# Patient Record
Sex: MALE | Race: WHITE | Employment: OTHER | URBAN - METROPOLITAN AREA
[De-identification: names, ages, dates, MRNs, and addresses within clinical notes are randomized per-mention and may not be internally consistent; named-entity substitution may affect disease eponyms.]

---

## 2017-01-17 RX ORDER — ZOLPIDEM TARTRATE 10 MG/1
TABLET ORAL
Qty: 30 TABLET | Refills: 2 | Status: SHIPPED
Start: 2017-01-17 | End: 2017-07-10

## 2017-01-17 NOTE — TELEPHONE ENCOUNTER
Message noted. Chart reviewed. May refill medication as requested. Script reviewed and signed/ printed. Will have nurse fax into pharmacy as this is controlled substance and notify patient.

## 2017-03-13 ENCOUNTER — OFFICE VISIT (OUTPATIENT)
Dept: DERMATOLOGY CLINIC | Facility: CLINIC | Age: 62
End: 2017-03-13

## 2017-03-13 DIAGNOSIS — D23.30 BENIGN NEOPLASM OF SKIN OF FACE: ICD-10-CM

## 2017-03-13 DIAGNOSIS — Z85.828 HISTORY OF SKIN CANCER: ICD-10-CM

## 2017-03-13 DIAGNOSIS — D23.4 BENIGN NEOPLASM OF SCALP AND SKIN OF NECK: ICD-10-CM

## 2017-03-13 DIAGNOSIS — L57.0 AK (ACTINIC KERATOSIS): Primary | ICD-10-CM

## 2017-03-13 DIAGNOSIS — D22.9 MULTIPLE NEVI: ICD-10-CM

## 2017-03-13 DIAGNOSIS — D23.70 BENIGN NEOPLASM OF SKIN OF LOWER LIMB, INCLUDING HIP, UNSPECIFIED LATERALITY: ICD-10-CM

## 2017-03-13 DIAGNOSIS — D23.5 BENIGN NEOPLASM OF SKIN OF TRUNK, EXCEPT SCROTUM: ICD-10-CM

## 2017-03-13 DIAGNOSIS — L82.1 SEBORRHEIC KERATOSES: ICD-10-CM

## 2017-03-13 DIAGNOSIS — D23.60 BENIGN NEOPLASM OF SKIN OF UPPER LIMB, INCLUDING SHOULDER, UNSPECIFIED LATERALITY: ICD-10-CM

## 2017-03-13 PROCEDURE — 99213 OFFICE O/P EST LOW 20 MIN: CPT | Performed by: DERMATOLOGY

## 2017-03-13 PROCEDURE — 17000 DESTRUCT PREMALG LESION: CPT | Performed by: DERMATOLOGY

## 2017-03-27 NOTE — PROGRESS NOTES
Nidia Coelho is a 63year old male. HPI:     CC:  Patient presents with:  Full Skin Exam: LOV 10/10/2016. Pt presenting for full body skin exam. Personal hx of AKs and BCC. Allergies:  Review of patient's allergies indicates no known allergies. Actinic keratosis    • Skin cancer 2-1-16     BCC L ant. hairline         Past Surgical History    OTHER SURGICAL HISTORY  12/1/2001    Comment pituitary tumor removed    COLONOSCOPY      CHOLECYSTECTOMY         Social History   Marital Status:   Sp mucosa, eyelids, lips external ears, back, chest,/ breasts, axillae,  abdomen, arms, legs, palms.      Multiple light to medium brown, well marginated, uniformly pigmented, macules and papules 6 mm and less scattered on exam. pigmented lesions examined with not resolved. Flesh-colored papule at right ala observe. 3 mm. Patient with history of skin cancer. No evidence of recurrence. No new skin cancer.     Multiple lentigines nevi keratoses temples hairline upper back shoulders lower extremities no o

## 2017-04-14 ENCOUNTER — OFFICE VISIT (OUTPATIENT)
Dept: DERMATOLOGY CLINIC | Facility: CLINIC | Age: 62
End: 2017-04-14

## 2017-04-14 DIAGNOSIS — L82.1 SEBORRHEIC KERATOSES: ICD-10-CM

## 2017-04-14 DIAGNOSIS — D23.9 BENIGN NEOPLASM OF SKIN, UNSPECIFIED LOCATION: ICD-10-CM

## 2017-04-14 DIAGNOSIS — Z85.828 HISTORY OF SKIN CANCER: ICD-10-CM

## 2017-04-14 DIAGNOSIS — L57.0 AK (ACTINIC KERATOSIS): Primary | ICD-10-CM

## 2017-04-14 PROCEDURE — 99212 OFFICE O/P EST SF 10 MIN: CPT | Performed by: DERMATOLOGY

## 2017-04-14 PROCEDURE — 17000 DESTRUCT PREMALG LESION: CPT | Performed by: DERMATOLOGY

## 2017-05-01 NOTE — PROGRESS NOTES
Ben Lebron is a 63year old male. HPI:     CC:  Patient presents with:  Lesion: Established pt (LOV 3/13/2017) presents with new lesion on forehead that is nonhealing x3 weeks. Hx oa AKs and BCC. Pt applying cetaphil.          Allergies:  Review of patie • Sinus disorder      sinus problems   • Actinic keratosis    • Skin cancer 2-1-16     BCC L ant. hairline         Past Surgical History    OTHER SURGICAL HISTORY  12/1/2001    Comment pituitary tumor removed    COLONOSCOPY      CHOLECYSTECTOMY         S scalp, head, neck, face,nails, hair, external eyes, including conjunctival mucosa, eyelids, lips external ears , arms, digits,palms.      Multiple light to medium brown, well marginated, uniformly pigmented, macules and papules 6 mm and less scattered on ex See  Medications in grid. Instructions reviewed at length. Benign nevi, seborrheic  keratoses, cherry angiomas:  Reassurance regarding other benign skin lesions. Signs and symptoms of skin cancer, ABCDE's of melanoma discussed with patient.  Sunscreen us

## 2017-05-30 ENCOUNTER — OFFICE VISIT (OUTPATIENT)
Dept: FAMILY MEDICINE CLINIC | Facility: CLINIC | Age: 62
End: 2017-05-30

## 2017-05-30 VITALS
SYSTOLIC BLOOD PRESSURE: 148 MMHG | WEIGHT: 177 LBS | BODY MASS INDEX: 23.98 KG/M2 | DIASTOLIC BLOOD PRESSURE: 91 MMHG | HEART RATE: 64 BPM | HEIGHT: 72 IN | TEMPERATURE: 98 F | RESPIRATION RATE: 18 BRPM

## 2017-05-30 DIAGNOSIS — Z00.00 ROUTINE PHYSICAL EXAMINATION: ICD-10-CM

## 2017-05-30 PROCEDURE — 99396 PREV VISIT EST AGE 40-64: CPT | Performed by: FAMILY MEDICINE

## 2017-05-30 NOTE — PROGRESS NOTES
HPI:    Patient ID: Max Dickson is a [address] old male. HPI Comments: Patient is here for routine physical exam. No acute issues. No significant chronic medical problems. Patient is requesting his blood testing. Diet and exercise have been better now.  P normal. Judgment and thought content normal.   Vitals reviewed. ASSESSMENT/PLAN:   Routine physical examination:  - Exam is unremarkable. Screening tests were discussed, and after discussion, will check lab work / PSA as below.  Healthy diet, ex

## 2017-07-11 RX ORDER — ZOLPIDEM TARTRATE 10 MG/1
TABLET ORAL
Qty: 30 TABLET | Refills: 1 | Status: SHIPPED
Start: 2017-07-11 | End: 2017-07-11

## 2017-07-12 RX ORDER — ZOLPIDEM TARTRATE 10 MG/1
TABLET ORAL
Qty: 30 TABLET | Refills: 2 | Status: SHIPPED
Start: 2017-07-12 | End: 2018-03-13

## 2017-09-25 ENCOUNTER — OFFICE VISIT (OUTPATIENT)
Dept: DERMATOLOGY CLINIC | Facility: CLINIC | Age: 62
End: 2017-09-25

## 2017-09-25 DIAGNOSIS — D23.4 BENIGN NEOPLASM OF SCALP AND SKIN OF NECK: ICD-10-CM

## 2017-09-25 DIAGNOSIS — D23.60 BENIGN NEOPLASM OF SKIN OF UPPER LIMB, INCLUDING SHOULDER, UNSPECIFIED LATERALITY: ICD-10-CM

## 2017-09-25 DIAGNOSIS — Z85.828 HISTORY OF SKIN CANCER: ICD-10-CM

## 2017-09-25 DIAGNOSIS — D23.30 BENIGN NEOPLASM OF SKIN OF FACE: ICD-10-CM

## 2017-09-25 DIAGNOSIS — L82.1 SEBORRHEIC KERATOSES: ICD-10-CM

## 2017-09-25 DIAGNOSIS — L57.0 AK (ACTINIC KERATOSIS): Primary | ICD-10-CM

## 2017-09-25 DIAGNOSIS — D22.9 MULTIPLE NEVI: ICD-10-CM

## 2017-09-25 PROCEDURE — 99213 OFFICE O/P EST LOW 20 MIN: CPT | Performed by: DERMATOLOGY

## 2017-09-25 PROCEDURE — 17000 DESTRUCT PREMALG LESION: CPT | Performed by: DERMATOLOGY

## 2017-09-25 PROCEDURE — 99212 OFFICE O/P EST SF 10 MIN: CPT | Performed by: DERMATOLOGY

## 2017-10-08 NOTE — PROGRESS NOTES
Marcello Dhaliwal is a 63year old male. HPI:     CC:  Patient presents with:  Lesion: LOV: 4/14/17 Presents with new lesion on scalp x1year. Pt has hx of AKs and BCC. Allergies:  Review of patient's allergies indicates no known allergies.     HISTORY: sinus problems   • Skin cancer 2-1-16    BCC L ant. hairline     Past Surgical History:  No date: CHOLECYSTECTOMY  No date: COLONOSCOPY  12/1/2001: OTHER SURGICAL HISTORY      Comment: pituitary tumor removed    Social History  Social History   Marital sta external eyes, including conjunctival mucosa, eyelids, lips external ears , arms, digits,palms.      Multiple light to medium brown, well marginated, uniformly pigmented, macules and papules 6 mm and less scattered on exam. pigmented lesions examined with d irritation pain tenderness potential discussed. Anticipate one month for resolution of symptoms. Plan recheck in one month after completion of treatment course. Consider alternative treatment biopsy if not resolved.   Had not used recenltly --would  Use

## 2018-03-13 RX ORDER — ZOLPIDEM TARTRATE 10 MG/1
TABLET ORAL
Qty: 30 TABLET | Refills: 2 | Status: SHIPPED
Start: 2018-03-13 | End: 2018-03-14

## 2018-03-14 RX ORDER — ZOLPIDEM TARTRATE 10 MG/1
TABLET ORAL
Qty: 30 TABLET | Refills: 2 | Status: SHIPPED
Start: 2018-03-14 | End: 2018-09-07

## 2018-03-15 NOTE — TELEPHONE ENCOUNTER
Per Jenn from [facility] pharm they already rec'd rx ref for zolpidem. .  See refill notes 3-14-18, Rx faxed/confirmed to [facility] .

## 2018-03-22 ENCOUNTER — OFFICE VISIT (OUTPATIENT)
Dept: FAMILY MEDICINE CLINIC | Facility: CLINIC | Age: 63
End: 2018-03-22

## 2018-03-22 VITALS
HEIGHT: 72 IN | RESPIRATION RATE: 18 BRPM | SYSTOLIC BLOOD PRESSURE: 138 MMHG | BODY MASS INDEX: 24.52 KG/M2 | TEMPERATURE: 98 F | HEART RATE: 65 BPM | WEIGHT: 181 LBS | DIASTOLIC BLOOD PRESSURE: 87 MMHG

## 2018-03-22 DIAGNOSIS — Z12.11 COLON CANCER SCREENING: Primary | ICD-10-CM

## 2018-03-22 DIAGNOSIS — Z00.00 ROUTINE PHYSICAL EXAMINATION: ICD-10-CM

## 2018-03-22 PROCEDURE — 99396 PREV VISIT EST AGE 40-64: CPT | Performed by: FAMILY MEDICINE

## 2018-03-22 RX ORDER — VALACYCLOVIR HYDROCHLORIDE 500 MG/1
0.5 TABLET, FILM COATED ORAL EVERY 12 HOURS SCHEDULED
Qty: 14 TABLET | Refills: 2 | Status: SHIPPED | OUTPATIENT
Start: 2018-03-22 | End: 2019-05-02

## 2018-03-22 NOTE — PROGRESS NOTES
HPI:    Patient ID: Nesha Brito is a 63year old male. Patient is here for routine physical exam. No acute issues. No significant chronic medical problems. Patient is requesting testing. Diet and exercise have been good.  Past medical history, family hi Genitourinary Comments: Prostate exam discussed; deferred at this time. Musculoskeletal: Normal range of motion. Lymphadenopathy:     He has no cervical adenopathy. Neurological: He is alert. He has normal reflexes. Skin: No rash noted.    Psychia

## 2018-04-04 ENCOUNTER — TELEPHONE (OUTPATIENT)
Dept: DERMATOLOGY CLINIC | Facility: CLINIC | Age: 63
End: 2018-04-04

## 2018-04-04 ENCOUNTER — OFFICE VISIT (OUTPATIENT)
Dept: DERMATOLOGY CLINIC | Facility: CLINIC | Age: 63
End: 2018-04-04

## 2018-04-04 DIAGNOSIS — D23.60 BENIGN NEOPLASM OF SKIN OF UPPER LIMB, INCLUDING SHOULDER, UNSPECIFIED LATERALITY: ICD-10-CM

## 2018-04-04 DIAGNOSIS — D23.5 BENIGN NEOPLASM OF SKIN OF TRUNK, EXCEPT SCROTUM: ICD-10-CM

## 2018-04-04 DIAGNOSIS — D23.4 BENIGN NEOPLASM OF SCALP AND SKIN OF NECK: ICD-10-CM

## 2018-04-04 DIAGNOSIS — Z85.828 HISTORY OF SKIN CANCER: ICD-10-CM

## 2018-04-04 DIAGNOSIS — D23.30 BENIGN NEOPLASM OF SKIN OF FACE: ICD-10-CM

## 2018-04-04 DIAGNOSIS — L82.1 SEBORRHEIC KERATOSES: ICD-10-CM

## 2018-04-04 DIAGNOSIS — D22.9 MULTIPLE NEVI: ICD-10-CM

## 2018-04-04 DIAGNOSIS — L57.0 AK (ACTINIC KERATOSIS): Primary | ICD-10-CM

## 2018-04-04 PROCEDURE — 99212 OFFICE O/P EST SF 10 MIN: CPT | Performed by: DERMATOLOGY

## 2018-04-04 PROCEDURE — 99213 OFFICE O/P EST LOW 20 MIN: CPT | Performed by: DERMATOLOGY

## 2018-04-04 PROCEDURE — 17000 DESTRUCT PREMALG LESION: CPT | Performed by: DERMATOLOGY

## 2018-04-06 ENCOUNTER — APPOINTMENT (OUTPATIENT)
Dept: LAB | Facility: HOSPITAL | Age: 63
End: 2018-04-06
Attending: FAMILY MEDICINE
Payer: COMMERCIAL

## 2018-04-06 DIAGNOSIS — Z00.00 ROUTINE PHYSICAL EXAMINATION: ICD-10-CM

## 2018-04-06 PROCEDURE — 80053 COMPREHEN METABOLIC PANEL: CPT

## 2018-04-06 PROCEDURE — 80061 LIPID PANEL: CPT

## 2018-04-06 PROCEDURE — 84443 ASSAY THYROID STIM HORMONE: CPT

## 2018-04-06 PROCEDURE — 36415 COLL VENOUS BLD VENIPUNCTURE: CPT

## 2018-04-06 PROCEDURE — 85027 COMPLETE CBC AUTOMATED: CPT

## 2018-04-11 NOTE — TELEPHONE ENCOUNTER
Could try tazorac at [facility] if he is ok with this. A little stronger so use 2-3 times per week to start.  Still helps with the ak's etc.

## 2018-04-11 NOTE — TELEPHONE ENCOUNTER
s/w pt. He picked up tretinoin for his AKs but doesn't remember how long he should use it for? Kindly advise please.

## 2018-04-12 NOTE — TELEPHONE ENCOUNTER
Pt contacted, given Dr. Moses Toscano' advice below. Pt verbalized understanding and repeated back information.   Pt instructed that if tretinoin does cause areas on face to become irritated he can wash face with a mild/moisturizing cleanser like that of Cetaphil

## 2018-04-12 NOTE — TELEPHONE ENCOUNTER
For the tretinoin --For sk's may use 2-3 times a week up to daily if not irritated just on the brown spots. For ak's all over forehead temples nose cheeks, etc ( bascially wht whole face).  Pea sized dot at night , 2-3 x/week--might get red or scaly so he

## 2018-04-15 NOTE — PROGRESS NOTES
Jah Anders is a 63year old male. HPI:     CC:  Patient presents with:  Full Skin Exam: LOV: 09/25/17. Pt presents for a full skin exam. Pt has personal hx of AKS and BCC. Allergies:  Patient has no known allergies.     HISTORY:    Past Medical H Last, done by Dr. Solo Almendarez   • Pituitary adenoma [facility])     tsh   • Pituitary tumor 12/2001   • Sinus disorder     sinus problems   • Skin cancer 2-1-16    BCC L ant. hairline     Past Surgical History:  No date: CHOLECYSTECTOMY  No date: COLONOSCOPY  12/ Well-developed well-nourished patient alert oriented in no acute distress. Exam performed, including scalp, head, neck, face,nails, hair, external eyes, including conjunctival mucosa, eyelids, lips external ears , arms, digits,palms.      Multiple light to discussed. Lesions treated with cryo- . Biopsy if not resolved. Actinic keratoses. Precancerous nature discussed.   Treatment options reviewed at length we will proceed with topical therapy with zyclara   to left temple, right temple lateral zygomatic

## 2018-05-30 ENCOUNTER — OFFICE VISIT (OUTPATIENT)
Dept: DERMATOLOGY CLINIC | Facility: CLINIC | Age: 63
End: 2018-05-30

## 2018-05-30 DIAGNOSIS — L82.1 SEBORRHEIC KERATOSES: ICD-10-CM

## 2018-05-30 DIAGNOSIS — D23.4 BENIGN NEOPLASM OF SCALP AND SKIN OF NECK: ICD-10-CM

## 2018-05-30 DIAGNOSIS — D23.30 BENIGN NEOPLASM OF SKIN OF FACE: ICD-10-CM

## 2018-05-30 DIAGNOSIS — Z85.828 HISTORY OF SKIN CANCER: ICD-10-CM

## 2018-05-30 DIAGNOSIS — L57.0 AK (ACTINIC KERATOSIS): Primary | ICD-10-CM

## 2018-05-30 DIAGNOSIS — D22.9 MULTIPLE NEVI: ICD-10-CM

## 2018-05-30 PROCEDURE — 99213 OFFICE O/P EST LOW 20 MIN: CPT | Performed by: DERMATOLOGY

## 2018-05-30 PROCEDURE — 99212 OFFICE O/P EST SF 10 MIN: CPT | Performed by: DERMATOLOGY

## 2018-05-30 RX ORDER — AZITHROMYCIN 250 MG/1
TABLET, FILM COATED ORAL
COMMUNITY
Start: 2018-04-24 | End: 2019-04-23

## 2018-05-30 RX ORDER — BENZONATATE 100 MG/1
CAPSULE ORAL
COMMUNITY
Start: 2018-04-24 | End: 2019-04-23

## 2018-06-10 NOTE — PROGRESS NOTES
Rj Mckeon is a 63year old male. HPI:     CC:  Patient presents with:  Actinic Keratosis: LOV: 04/04/18. Pt presents with f/u to AK, reassessment today. Allergies:  Patient has no known allergies.     HISTORY:    Past Medical History:   Diagnosis • Measles    • Mumps    • Pancreatitis 2010   • Physical exam 2004    Last, done by Dr. Jose Manuel Carlson   • Pituitary adenoma [facility])     tsh   • Pituitary tumor 12/2001   • Sinus disorder     sinus problems   • Skin cancer 2-1-16    BCC L ant. hairline     Past S changeing lesions other than noted above. No fevers, chills, night sweats, unusual sun sensitivity. No other skin complaints. History, medications, allergies reviewed as noted.        Physical Examination:     Well-developed well-nourished patient a to these areas 2-3 times weekly as tolerated. Titrate up dose extra sun protection and moisturizer underneath. Call if more irritated fact this may help with some background sun damage, cancer and AK prevention discussed. Actinic keratoses.   Precanc of skin cancer, ABCDE's of melanoma discussed with patient. Sunscreen use, sun protection, self exams reviewed. Followup as noted RTC ---routine checkup    6 mos -one year or p.r.n. No other new suspicious lesions plan recheck 6 months or as needed.

## 2018-09-08 RX ORDER — ZOLPIDEM TARTRATE 10 MG/1
TABLET ORAL
Qty: 30 TABLET | Refills: 2 | Status: SHIPPED
Start: 2018-09-08 | End: 2019-04-11

## 2018-09-08 NOTE — TELEPHONE ENCOUNTER
No Protocol on this med.        Pending Prescriptions Disp Refills    ZOLPIDEM TARTRATE 10 MG Oral Tab [Pharmacy Med Name: ZOLPIDEM TARTRATE 10 MG TABLET] 30 tablet 2     Sig: TAKE 1 TABLET BY MOUTH AT BEDTIME AS NEEDED FOR SLEEP           LOV 3-22-18  LR 3

## 2018-12-14 ENCOUNTER — TELEPHONE (OUTPATIENT)
Dept: DERMATOLOGY CLINIC | Facility: CLINIC | Age: 63
End: 2018-12-14

## 2018-12-14 NOTE — TELEPHONE ENCOUNTER
Pt does not want to wait till end of January for an appt. It is not an emergency type visit but he claims it is very frustrating to wait 4 wks for an appt. pls call to discuss. . I told him we possibly would have to have him wait till January for an appt.

## 2018-12-19 NOTE — TELEPHONE ENCOUNTER
S/w pt - pt with a new lesion to forehead that is \"sore\" wants to come in sooner than next avilable appt - appt made for 12/28/18.

## 2018-12-28 ENCOUNTER — OFFICE VISIT (OUTPATIENT)
Dept: DERMATOLOGY CLINIC | Facility: CLINIC | Age: 63
End: 2018-12-28
Payer: COMMERCIAL

## 2018-12-28 DIAGNOSIS — D23.30 BENIGN NEOPLASM OF SKIN OF FACE: ICD-10-CM

## 2018-12-28 DIAGNOSIS — L57.0 AK (ACTINIC KERATOSIS): Primary | ICD-10-CM

## 2018-12-28 DIAGNOSIS — Z85.828 HISTORY OF SKIN CANCER: ICD-10-CM

## 2018-12-28 DIAGNOSIS — D23.4 BENIGN NEOPLASM OF SCALP AND SKIN OF NECK: ICD-10-CM

## 2018-12-28 DIAGNOSIS — D23.5 BENIGN NEOPLASM OF SKIN OF TRUNK, EXCEPT SCROTUM: ICD-10-CM

## 2018-12-28 DIAGNOSIS — L82.1 SEBORRHEIC KERATOSES: ICD-10-CM

## 2018-12-28 DIAGNOSIS — D22.9 MULTIPLE NEVI: ICD-10-CM

## 2018-12-28 DIAGNOSIS — D23.60 BENIGN NEOPLASM OF SKIN OF UPPER LIMB, INCLUDING SHOULDER, UNSPECIFIED LATERALITY: ICD-10-CM

## 2018-12-28 PROCEDURE — 17000 DESTRUCT PREMALG LESION: CPT | Performed by: DERMATOLOGY

## 2018-12-28 PROCEDURE — 99212 OFFICE O/P EST SF 10 MIN: CPT | Performed by: DERMATOLOGY

## 2018-12-28 PROCEDURE — 17003 DESTRUCT PREMALG LES 2-14: CPT | Performed by: DERMATOLOGY

## 2018-12-28 PROCEDURE — 99213 OFFICE O/P EST LOW 20 MIN: CPT | Performed by: DERMATOLOGY

## 2019-01-07 NOTE — PROGRESS NOTES
Maida Howard is a 66year old male. HPI:     CC:  Patient presents with:  Upper Body Exam: LOV 5/30/2018 Patient present with painful lesion of concern and question regarding medications .  Patient has personal hx of AK and BCC        Allergies:  Patient h Diagnosis Date   • Actinic keratosis    • Basal cell carcinoma    • Chicken pox    • Measles    • Mumps    • Pancreatitis 2010   • Physical exam 2004    Last, done by Dr. Mona Vasquez   • Pituitary adenoma [facility])     tsh   • Pituitary tumor 12/2001   • Sinus d Special Diet: Not Asked        Back Care: Not Asked        Exercise: Not Asked        Bike Helmet: Not Asked        Seat Belt: Not Asked        Self-Exams: Not Asked    Social History Narrative      Not on file    Family History   Problem Relation Age of O specific lesions. Otherwise remarkable for lesions as noted on map. Assessment / plan:    No orders of the defined types were placed in this encounter.       Meds & Refills for this Visit:  Requested Prescriptions      No prescriptions requested or ord various therapies, risks benefits discussed. Pathophysiology discussed with patient. Therapeutic options reviewed. See  Medications in grid. Instructions reviewed at length.     Benign nevi, seborrheic  keratoses, cherry angiomas:  Reassurance regarding o

## 2019-01-09 ENCOUNTER — TELEPHONE (OUTPATIENT)
Dept: DERMATOLOGY CLINIC | Facility: CLINIC | Age: 64
End: 2019-01-09

## 2019-01-09 NOTE — TELEPHONE ENCOUNTER
LOV 12/28/18 pt states his lesions to face have not fallen off yet (cryo done at last visit) pt informed it may be too soon but he insists that he's had this done before and they should have fallen off by now. Wants to know what to do next, please advise.

## 2019-04-13 RX ORDER — ZOLPIDEM TARTRATE 10 MG/1
TABLET ORAL
Qty: 30 TABLET | Refills: 0 | OUTPATIENT
Start: 2019-04-13 | End: 2019-06-22

## 2019-04-13 NOTE — TELEPHONE ENCOUNTER
Controlled medication pending for review. If approved needs to be called in or faxed by on-site staff.     Last Rx: 9/8/18  Requested Prescriptions     Pending Prescriptions Disp Refills   • ZOLPIDEM TARTRATE 10 MG Oral Tab [Pharmacy Med Name: ZOLPIDEM TAR

## 2019-04-23 ENCOUNTER — TELEPHONE (OUTPATIENT)
Dept: DERMATOLOGY CLINIC | Facility: CLINIC | Age: 64
End: 2019-04-23

## 2019-04-23 ENCOUNTER — OFFICE VISIT (OUTPATIENT)
Dept: FAMILY MEDICINE CLINIC | Facility: CLINIC | Age: 64
End: 2019-04-23
Payer: COMMERCIAL

## 2019-04-23 VITALS
RESPIRATION RATE: 18 BRPM | HEIGHT: 70.7 IN | BODY MASS INDEX: 25.06 KG/M2 | HEART RATE: 74 BPM | WEIGHT: 179 LBS | TEMPERATURE: 98 F | SYSTOLIC BLOOD PRESSURE: 138 MMHG | DIASTOLIC BLOOD PRESSURE: 80 MMHG

## 2019-04-23 DIAGNOSIS — Z00.00 ROUTINE PHYSICAL EXAMINATION: Primary | ICD-10-CM

## 2019-04-23 PROCEDURE — 99396 PREV VISIT EST AGE 40-64: CPT | Performed by: FAMILY MEDICINE

## 2019-04-23 NOTE — TELEPHONE ENCOUNTER
He has questions about the two creams that he is supposed to be using. He needs to know which cream on which area.

## 2019-04-23 NOTE — PROGRESS NOTES
HPI:    Patient ID: Pino Jiang is a 66year old male. Patient is here for routine physical exam. No acute issues. No significant chronic medical problems. Patient is requesting testing.  Diet and exercise have been fairly good Past medical history, fam to person, place, and time. He has normal reflexes. Skin: Skin is warm and dry. Psychiatric: He has a normal mood and affect. His behavior is normal.              ASSESSMENT/PLAN:   Routine physical examination:  - Exam is unremarkable.  Screening tests

## 2019-04-24 NOTE — TELEPHONE ENCOUNTER
Pt last seen 12/28/18. S/w pt. He never used tretinoin and has not restarted zyclara (last used in 2016) wants to review where and how often he should use it now?  Or F/U first? Please advise

## 2019-04-24 NOTE — TELEPHONE ENCOUNTER
Clarified with KMT - pt not to use tretinoin at all at this time, and use zyclara 2x week for 4 weeks.  He will need a refill on it, I could not pend it as a refill since it's not under current meds, please advise if you can send it as a refill on your end,

## 2019-04-24 NOTE — TELEPHONE ENCOUNTER
zyclara to ears forehead ( lateral especially left) cheekbones ( zygomatic areas bilat)  2x per week x 4-6 weeks--break for 1 week if too irritated.

## 2019-04-25 NOTE — TELEPHONE ENCOUNTER
Pt informed that medication was sent to pharmacy. Reviewed medication instructions. Voiced understanding.

## 2019-05-02 RX ORDER — VALACYCLOVIR HYDROCHLORIDE 500 MG/1
0.5 TABLET, FILM COATED ORAL EVERY 12 HOURS SCHEDULED
Qty: 14 TABLET | Refills: 0 | Status: SHIPPED | OUTPATIENT
Start: 2019-05-02 | End: 2019-10-18

## 2019-05-02 NOTE — TELEPHONE ENCOUNTER
Is there a pharmacy that Camilo Gomeso can be sent to. Pharmacies locally are out of stock.  Please call

## 2019-05-02 NOTE — TELEPHONE ENCOUNTER
Review pended refill request as it does not fall under a protocol.     Last Rx: 03/22/18  LOV: 04/23/19

## 2019-05-02 NOTE — TELEPHONE ENCOUNTER
Message noted: Chart reviewed and may refill medication as requested times one. Prescription sent to listed pharmacy. can notify patient.

## 2019-05-07 ENCOUNTER — TELEPHONE (OUTPATIENT)
Dept: DERMATOLOGY CLINIC | Facility: CLINIC | Age: 64
End: 2019-05-07

## 2019-05-07 RX ORDER — IMIQUIMOD 37.5 MG/G
CREAM TOPICAL
Qty: 1 EACH | Refills: 1 | Status: SHIPPED | OUTPATIENT
Start: 2019-05-07 | End: 2020-01-24

## 2019-05-07 NOTE — TELEPHONE ENCOUNTER
Ok to change. Please reassure pt not much difference in irritation between strenghths.   New Rx sent for 3.75% cream to THE [facility] [place]

## 2019-06-24 RX ORDER — ZOLPIDEM TARTRATE 10 MG/1
TABLET ORAL
Qty: 30 TABLET | Refills: 2 | Status: SHIPPED
Start: 2019-06-24 | End: 2019-12-26

## 2019-06-24 NOTE — TELEPHONE ENCOUNTER
Controlled medications pending for review. If approved needs to be called in or faxed by on site staff.     Last Rx: 4-13-19 # 30  LOV: 4-23-19    Requested Prescriptions     Pending Prescriptions Disp Refills   • ZOLPIDEM TARTRATE 10 MG Oral Tab [Pharmacy

## 2019-06-24 NOTE — TELEPHONE ENCOUNTER
[facility] Fax was successful.

## 2019-06-26 ENCOUNTER — TELEPHONE (OUTPATIENT)
Dept: DERMATOLOGY CLINIC | Facility: CLINIC | Age: 64
End: 2019-06-26

## 2019-06-26 NOTE — TELEPHONE ENCOUNTER
Pt is very concerned over some spots that just appeared, he does not want to wait 4 or 5 wks for appt.  pls call to discuss

## 2019-06-26 NOTE — TELEPHONE ENCOUNTER
Established pt, s/p use of zyclara to face - wants to be re-evaled, pt also concerned about two lesions to left shoulder that he noticed a week ago - denies discomfort, lesions are bumpy \"squished together\".  Wants to be seen sooner than next avilable - a

## 2019-07-01 ENCOUNTER — OFFICE VISIT (OUTPATIENT)
Dept: DERMATOLOGY CLINIC | Facility: CLINIC | Age: 64
End: 2019-07-01
Payer: COMMERCIAL

## 2019-07-01 DIAGNOSIS — D48.5 NEOPLASM OF UNCERTAIN BEHAVIOR OF SKIN: Primary | ICD-10-CM

## 2019-07-01 PROCEDURE — 99213 OFFICE O/P EST LOW 20 MIN: CPT | Performed by: DERMATOLOGY

## 2019-07-01 PROCEDURE — 88313 SPECIAL STAINS GROUP 2: CPT

## 2019-07-01 PROCEDURE — 11102 TANGNTL BX SKIN SINGLE LES: CPT | Performed by: DERMATOLOGY

## 2019-07-01 PROCEDURE — 88341 IMHCHEM/IMCYTCHM EA ADD ANTB: CPT

## 2019-07-01 PROCEDURE — 88342 IMHCHEM/IMCYTCHM 1ST ANTB: CPT

## 2019-07-01 PROCEDURE — 88325 CONSLTJ COMPRE RVW REC REPRT: CPT | Performed by: DERMATOLOGY

## 2019-07-08 ENCOUNTER — TELEPHONE (OUTPATIENT)
Dept: DERMATOLOGY CLINIC | Facility: CLINIC | Age: 64
End: 2019-07-08

## 2019-07-08 NOTE — TELEPHONE ENCOUNTER
Call received from Dr. Triston Shah pathology, biopsy done 7/1/19 to left anterior pinna revealed a sebaceous carcinoma - path to follow

## 2019-07-10 NOTE — TELEPHONE ENCOUNTER
Discussed with Lorin Saenz from [place] [facility] - she will let Dr. Cecilio Martinez know and they will send it to a reference lab. They will call us with an update to let us know which one they sent it to.

## 2019-07-10 NOTE — TELEPHONE ENCOUNTER
This was one of the specimens that I NEEDED read by Albaro Cortes. This needs to be reviewed by reference lab. This was not our mistake, so I expect outside review. Thanks --bump this up if needed.  Thanks, K

## 2019-07-15 NOTE — PROGRESS NOTES
Operative Report                     Shave/  Tangential biopsy     Clinical diagnosis:    Size of lesion:    Location:Diagnosis/Clinical History: pt with chronicly irritated leison on left pinna  Spec 1 Description >>>>>: left anterior pinna  Spec 1

## 2019-07-15 NOTE — PROGRESS NOTES
Jah Anders is a 66year old male. HPI:     CC:  Patient presents with:  Actinic Keratosis: Pt would like his back assessed and treated for AK. c/o of 2 lesions to L shoulder and skin irritation to the L ear. LOV 12/28/18.   Patient has personal hx of AK Actinic Keratoses Disp: 7.5 g Rfl: 0   VALACYCLOVIR  MG Oral Tab TAKE 1 TABLET (500 MG TOTAL) BY MOUTH EVERY 12 (TWELVE) HOURS. Disp: 14 tablet Rfl: 0   Tretinoin 0.05 % External Cream Apply to the face for actinic keratoses as directed at bedtime. file        Attends Jainism service: Not on file        Active member of club or organization: Not on file        Attends meetings of clubs or organizations: Not on file        Relationship status: Not on file      Intimate partner violence:        Fear scaly bleeding recently. Has noted more irritation. Had used Zyclara minimal irritation with this on forehead temples nose. Has not used the tretinoin consistently    Has been very careful use sunscreen.   Has not been out golfing yet very much due to th improvement in AK's would not expect as much redness and crusting. Tretinoin instructions discussed. 2-3 times weekly    Multiple benign keratoses including left ear, temples forehead.   Reviewed tretinoin instructions  2-3 times weekly, moisturizer with symptoms of skin cancer, ABCDE's of melanoma discussed with patient. Sunscreen use, sun protection, self exams reviewed. Followup as noted RTC ---routine checkup    6 mos -one year or p.r.n.       No other new suspicious lesions plan recheck 6 months or as

## 2019-07-20 NOTE — TELEPHONE ENCOUNTER
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 Reported 7/19/19. I would suggest MOHS--pt has seen Dr. Cari Woodruff in 2016.   Either Dr. Cj Lozoya is not in the [facility] any longer

## 2019-07-20 NOTE — PROGRESS NOTES
The pathology report from last visit showed on consult report carcinoma with clear cell changes from left ant pinna--sent to [facility]. Should have excision likely Mohs--see below. M/l PTCB  . Please log in test results.   Please call patient and inform of resu

## 2019-07-22 NOTE — TELEPHONE ENCOUNTER
Patient informed of test results and all KMT's recommendations. Voiced understanding. He has multiple questions including different treatment options. He sounds hesitant about MOHS.  Would like to speak with you personally - he does ask that you please call

## 2019-07-22 NOTE — PROGRESS NOTES
Please refer to tel enc, pt wishes to discuss dx and tx with [address]. Forwarded to [address] today.

## 2019-07-23 NOTE — TELEPHONE ENCOUNTER
Pt states he call dr Ethan Limon office and was able to make appointment.  Please fax to this fax #[phone]

## 2019-07-23 NOTE — TELEPHONE ENCOUNTER
Reviewed [place] consult with pt--looks like clear cell SCC--suggest he see Dr. Spencer Martinez for consultation initially then schedule surgery and possible plastics repair coordinated as well. First issue is the precise diagnosis as this will determine margins.   The s

## 2019-10-20 NOTE — TELEPHONE ENCOUNTER
Rx not part of protocol.   Last refilled on 5/2/119 #14 0RF  ·   Recent Outpatient Visits            3 months ago Neoplasm of uncertain behavior of skin    [facility] Nirmal Tyler MD    Office Visit    5 months ago Routine phys

## 2019-10-21 RX ORDER — VALACYCLOVIR HYDROCHLORIDE 500 MG/1
0.5 TABLET, FILM COATED ORAL EVERY 12 HOURS SCHEDULED
Qty: 14 TABLET | Refills: 0 | Status: SHIPPED | OUTPATIENT
Start: 2019-10-21 | End: 2020-01-24

## 2019-12-26 RX ORDER — ZOLPIDEM TARTRATE 10 MG/1
TABLET ORAL
Qty: 30 TABLET | Refills: 1 | Status: SHIPPED | OUTPATIENT
Start: 2019-12-26 | End: 2020-01-24

## 2019-12-26 NOTE — TELEPHONE ENCOUNTER
Message noted: Chart reviewed and may refill zolpidem as requested. Script sent to listed pharmacy by secure method. Please notify patient.

## 2019-12-26 NOTE — TELEPHONE ENCOUNTER
Review pended refill request as it does not fall under a protocol.     Requested Prescriptions     Pending Prescriptions Disp Refills   • ZOLPIDEM TARTRATE 10 MG Oral Tab [Pharmacy Med Name: ZOLPIDEM TARTRATE 10 MG TABLET] 30 tablet 0     Sig: TAKE 1 TABLET

## 2020-01-24 ENCOUNTER — OFFICE VISIT (OUTPATIENT)
Dept: FAMILY MEDICINE CLINIC | Facility: CLINIC | Age: 65
End: 2020-01-24
Payer: COMMERCIAL

## 2020-01-24 VITALS
DIASTOLIC BLOOD PRESSURE: 84 MMHG | WEIGHT: 177 LBS | SYSTOLIC BLOOD PRESSURE: 157 MMHG | HEIGHT: 70.7 IN | BODY MASS INDEX: 24.78 KG/M2 | HEART RATE: 71 BPM | TEMPERATURE: 98 F

## 2020-01-24 DIAGNOSIS — J01.00 ACUTE MAXILLARY SINUSITIS, RECURRENCE NOT SPECIFIED: Primary | ICD-10-CM

## 2020-01-24 PROCEDURE — 99213 OFFICE O/P EST LOW 20 MIN: CPT | Performed by: FAMILY MEDICINE

## 2020-01-24 RX ORDER — AMOXICILLIN AND CLAVULANATE POTASSIUM 875; 125 MG/1; MG/1
1 TABLET, FILM COATED ORAL 2 TIMES DAILY
Qty: 14 TABLET | Refills: 0 | Status: SHIPPED | OUTPATIENT
Start: 2020-01-24 | End: 2020-01-31

## 2020-01-24 RX ORDER — ZOLPIDEM TARTRATE 10 MG/1
10 TABLET ORAL NIGHTLY PRN
COMMUNITY
End: 2020-04-15

## 2020-01-24 NOTE — PATIENT INSTRUCTIONS
Stop mucinex  Add allegra 180mg daily or zyrtec 10mg daily during the day  May take nexium or prilosec at bedtime as needed.

## 2020-01-24 NOTE — PROGRESS NOTES
HPI:    Patient ID: Noelle Morales is a 60year old male.     HPI  Patient presents with:  Throat Problem: feels like a lump on back of throat,  for last 2 wks,    Nasal Congestion: congestion,  phelm coming up , taking mucenix   No fever, no prior problem, n

## 2020-02-07 ENCOUNTER — OFFICE VISIT (OUTPATIENT)
Dept: DERMATOLOGY CLINIC | Facility: CLINIC | Age: 65
End: 2020-02-07
Payer: COMMERCIAL

## 2020-02-07 DIAGNOSIS — L57.0 AK (ACTINIC KERATOSIS): Primary | ICD-10-CM

## 2020-02-07 DIAGNOSIS — D23.4 BENIGN NEOPLASM OF SCALP AND SKIN OF NECK: ICD-10-CM

## 2020-02-07 DIAGNOSIS — D23.30 BENIGN NEOPLASM OF SKIN OF FACE: ICD-10-CM

## 2020-02-07 DIAGNOSIS — D23.60 BENIGN NEOPLASM OF SKIN OF UPPER LIMB, INCLUDING SHOULDER, UNSPECIFIED LATERALITY: ICD-10-CM

## 2020-02-07 DIAGNOSIS — Z85.828 HISTORY OF SKIN CANCER: ICD-10-CM

## 2020-02-07 DIAGNOSIS — L82.1 SEBORRHEIC KERATOSES: ICD-10-CM

## 2020-02-07 DIAGNOSIS — D22.9 MULTIPLE NEVI: ICD-10-CM

## 2020-02-07 DIAGNOSIS — D23.5 BENIGN NEOPLASM OF SKIN OF TRUNK, EXCEPT SCROTUM: ICD-10-CM

## 2020-02-07 PROCEDURE — 99213 OFFICE O/P EST LOW 20 MIN: CPT | Performed by: DERMATOLOGY

## 2020-02-07 RX ORDER — FLUOROURACIL 50 MG/G
CREAM TOPICAL
Qty: 40 G | Refills: 1 | Status: SHIPPED | OUTPATIENT
Start: 2020-02-07

## 2020-02-17 NOTE — PROGRESS NOTES
Rocky Whipple is a 60year old male. HPI:     CC:  Patient presents with:  Full Skin Exam: LOV 7/1/19. pt presenting today with full body skin check after [facility] surgery.  pt has HX of Ak,s,BCC, Clear cell tumor        Allergies:  Patient has no known allergi done by Dr. Jean Paul Grossman   • Pituitary adenoma [facility])     tsh   • Pituitary tumor 12/2001   • Sinus disorder     sinus problems   • Skin cancer 2-1-16    BCC L ant. hairline     Past Surgical History:   Procedure Laterality Date   • CHOLECYSTECTOMY     • COLONOS History of tanning: Not Asked        Outdoor occupation: Not Asked        Pt has a pacemaker: No        Pt has a defibrillator: No        Reaction to local anesthetic: No         Service: Not Asked        Blood Transfusions: Not Asked        Caf Physical Examination:     Well-developed well-nourished patient alert oriented in no acute distress.   Exam performed, including scalp, head, neck, face,nails, hair, external eyes, including conjunctival mucosa, eyelids, lips external ears , arms, digi resolved. Discussed possible irritation with this. Overall AK's improved. There are some scaly areas on the temples forehead cheeks discussed more long-term preventative treatment continue Retin-A.       Multiple benign keratoses including left ear, temp from errors in recognition.

## 2020-04-15 ENCOUNTER — TELEPHONE (OUTPATIENT)
Dept: FAMILY MEDICINE CLINIC | Facility: CLINIC | Age: 65
End: 2020-04-15

## 2020-04-15 RX ORDER — ZOLPIDEM TARTRATE 10 MG/1
10 TABLET ORAL NIGHTLY PRN
Qty: 30 TABLET | Refills: 1 | Status: SHIPPED | OUTPATIENT
Start: 2020-04-15 | End: 2020-10-24

## 2020-04-15 NOTE — TELEPHONE ENCOUNTER
Message noted: Chart reviewed and may refill medication as requested. Script sent to listed pharmacy by secure method.     Pt notified through [facility]

## 2020-06-18 ENCOUNTER — TELEPHONE (OUTPATIENT)
Dept: FAMILY MEDICINE CLINIC | Facility: CLINIC | Age: 65
End: 2020-06-18

## 2020-06-18 RX ORDER — VALACYCLOVIR HYDROCHLORIDE 500 MG/1
0.5 TABLET, FILM COATED ORAL EVERY 12 HOURS SCHEDULED
Qty: 14 TABLET | Refills: 0 | Status: SHIPPED | OUTPATIENT
Start: 2020-06-18 | End: 2020-11-19

## 2020-06-18 NOTE — TELEPHONE ENCOUNTER
Message noted: Chart reviewed and may refill medication as requested times one . Prescription sent to listed pharmacy. Pharmacy to notify patient.  Pt notified through [facility]

## 2020-06-18 NOTE — TELEPHONE ENCOUNTER
pt. states that he needs to get a new Rx for Valacyclovir med. 500mg. Take 1 tab by mouth every 12 hours.

## 2020-06-26 ENCOUNTER — OFFICE VISIT (OUTPATIENT)
Dept: DERMATOLOGY CLINIC | Facility: CLINIC | Age: 65
End: 2020-06-26
Payer: COMMERCIAL

## 2020-06-26 VITALS — HEIGHT: 72 IN | BODY MASS INDEX: 23.7 KG/M2 | WEIGHT: 175 LBS

## 2020-06-26 DIAGNOSIS — D23.4 BENIGN NEOPLASM OF SCALP AND SKIN OF NECK: ICD-10-CM

## 2020-06-26 DIAGNOSIS — D23.5 BENIGN NEOPLASM OF SKIN OF TRUNK, EXCEPT SCROTUM: ICD-10-CM

## 2020-06-26 DIAGNOSIS — D23.30 BENIGN NEOPLASM OF SKIN OF FACE: ICD-10-CM

## 2020-06-26 DIAGNOSIS — D23.60 BENIGN NEOPLASM OF SKIN OF UPPER LIMB, INCLUDING SHOULDER, UNSPECIFIED LATERALITY: ICD-10-CM

## 2020-06-26 DIAGNOSIS — D48.5 NEOPLASM OF UNCERTAIN BEHAVIOR OF SKIN: Primary | ICD-10-CM

## 2020-06-26 DIAGNOSIS — D22.9 MULTIPLE NEVI: ICD-10-CM

## 2020-06-26 DIAGNOSIS — L57.0 AK (ACTINIC KERATOSIS): ICD-10-CM

## 2020-06-26 DIAGNOSIS — L82.1 SEBORRHEIC KERATOSES: ICD-10-CM

## 2020-06-26 DIAGNOSIS — Z85.828 HISTORY OF SKIN CANCER: ICD-10-CM

## 2020-06-26 DIAGNOSIS — C44.519 BCC (BASAL CELL CARCINOMA), TRUNK: ICD-10-CM

## 2020-06-26 PROCEDURE — 88305 TISSUE EXAM BY PATHOLOGIST: CPT | Performed by: DERMATOLOGY

## 2020-06-26 PROCEDURE — 99213 OFFICE O/P EST LOW 20 MIN: CPT | Performed by: DERMATOLOGY

## 2020-06-26 PROCEDURE — 17262 DSTRJ MAL LES T/A/L 1.1-2.0: CPT | Performed by: DERMATOLOGY

## 2020-06-29 NOTE — PROGRESS NOTES
Keesha Jeffrey is a 60year old male. 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 Vilma Deng   • Gregory Leon of current or ex partner: Not on file        Emotionally abused: Not on file        Physically abused: Not on file        Forced sexual activity: Not on file    Other Topics      Concerns:        [place] up [place] a farm: Not Asked        History of tanning: Not crusty areas around the edge    Has been very careful use sunscreen. Has not been out golfing yet very much due to the weather  Patient presents with concerns above. Patient has been in their usual state of health.   History, medications, allergies revi BCC, 1.2cm pearly papule    Basal cell carcinoma. Electrodesiccation and curettage performed. See operative report, consent. Cancers nature discussed. Possibility of recurrence reviewed. Possibility of scarring reviewed.   Any postoperative problems in reviewed. May not be covered patient aware    Extensive lentigines keratoses no other new suspicious lesions    Please refer to map for specific lesions. See additional diagnoses. Pros cons of various therapies, risks benefits discussed. Pathophysiology appropriate

## 2020-07-02 ENCOUNTER — PATIENT MESSAGE (OUTPATIENT)
Dept: DERMATOLOGY CLINIC | Facility: CLINIC | Age: 65
End: 2020-07-02

## 2020-07-02 NOTE — PROGRESS NOTES
The pathology report from last visit showed   bcc right central chest e&c'ed at visit. Please log in test results. Spoke with pt was, was red and irritated around site. Stopped band aid an neosporin.   Ok to use hydrocortisone cream around bx site, barry

## 2020-07-03 NOTE — PROGRESS NOTES
Logged in test results book and pmh.    Pt informed of pathology results and scheduled for f/u on October 5th for full skin exam.

## 2020-08-05 ENCOUNTER — OFFICE VISIT (OUTPATIENT)
Dept: GASTROENTEROLOGY | Facility: CLINIC | Age: 65
End: 2020-08-05
Payer: COMMERCIAL

## 2020-08-05 ENCOUNTER — TELEPHONE (OUTPATIENT)
Dept: GASTROENTEROLOGY | Facility: CLINIC | Age: 65
End: 2020-08-05

## 2020-08-05 VITALS
BODY MASS INDEX: 25.34 KG/M2 | HEIGHT: 70 IN | HEART RATE: 84 BPM | WEIGHT: 177 LBS | DIASTOLIC BLOOD PRESSURE: 80 MMHG | SYSTOLIC BLOOD PRESSURE: 134 MMHG

## 2020-08-05 DIAGNOSIS — Z12.12 SCREENING FOR COLORECTAL CANCER: ICD-10-CM

## 2020-08-05 DIAGNOSIS — Z12.11 COLON CANCER SCREENING: Primary | ICD-10-CM

## 2020-08-05 DIAGNOSIS — D49.0 IPMN (INTRADUCTAL PAPILLARY MUCINOUS NEOPLASM): Primary | ICD-10-CM

## 2020-08-05 DIAGNOSIS — Z12.11 SCREENING FOR COLORECTAL CANCER: ICD-10-CM

## 2020-08-05 PROCEDURE — 3079F DIAST BP 80-89 MM HG: CPT | Performed by: INTERNAL MEDICINE

## 2020-08-05 PROCEDURE — 3075F SYST BP GE 130 - 139MM HG: CPT | Performed by: INTERNAL MEDICINE

## 2020-08-05 PROCEDURE — 3008F BODY MASS INDEX DOCD: CPT | Performed by: INTERNAL MEDICINE

## 2020-08-05 PROCEDURE — 99203 OFFICE O/P NEW LOW 30 MIN: CPT | Performed by: INTERNAL MEDICINE

## 2020-08-05 RX ORDER — AMLODIPINE BESYLATE 5 MG/1
5 TABLET ORAL DAILY
COMMUNITY
Start: 2020-07-08

## 2020-08-05 NOTE — PATIENT INSTRUCTIONS
1.  Schedule MRCP of the pancreas at your convenience. 2.  Schedule screening colonoscopy following a split dose Suprep and either IV sedation or monitored anesthesia care per scheduling. 3.  Stop smoking.

## 2020-08-05 NOTE — TELEPHONE ENCOUNTER
Scheduled for: Colonoscopy [id number]  Provider Name: Dr Rosalva Doran  Date: Bebeto Doss 10/20/2020    Location: [facility]  Sedation: IV   Time: 1:00 pm  Prep: split dose suprep    Meds/Allergies Reconciled?: NKDA  Diagnosis with codes:  Screening Z12.11  Was patient inform

## 2020-08-07 NOTE — PROGRESS NOTES
HPI:    Patient ID: Na Mai is a 63year old male. HPI  Jimena Miranda returns in follow-up. He has not been seen since July 2016. Please see previous notes for historical details.     In brief the patient has had approximately 7 episodes of acute pancreati Medications   Medication Sig Dispense Refill   • amLODIPine Besylate 5 MG Oral Tab Take 5 mg by mouth daily.      • Na Sulfate-K Sulfate-Mg Sulf (SUPREP BOWEL PREP KIT) 17.5-3.13-1.6 GM/177ML Oral Solution Take as directed 1 Bottle 0   • valACYclovir HCl 50 The patient requires a follow-up MRI/MRCP to determine stability of the IPMN. I have also reiterated my previous recommendations for smoking cessation and preferably alcohol cessation as well.   Further recommendations regarding surveillance/treatment pend

## 2020-09-24 ENCOUNTER — TELEPHONE (OUTPATIENT)
Dept: GASTROENTEROLOGY | Facility: CLINIC | Age: 65
End: 2020-09-24

## 2020-10-13 RX ORDER — OMEPRAZOLE 20 MG/1
20 TABLET, DELAYED RELEASE ORAL DAILY
COMMUNITY

## 2020-10-17 ENCOUNTER — APPOINTMENT (OUTPATIENT)
Dept: LAB | Facility: HOSPITAL | Age: 65
End: 2020-10-17
Attending: INTERNAL MEDICINE
Payer: COMMERCIAL

## 2020-10-17 DIAGNOSIS — Z01.818 PRE-OP TESTING: ICD-10-CM

## 2020-10-20 ENCOUNTER — HOSPITAL ENCOUNTER (OUTPATIENT)
Facility: HOSPITAL | Age: 65
Setting detail: HOSPITAL OUTPATIENT SURGERY
Discharge: HOME | End: 2020-10-20
Attending: INTERNAL MEDICINE | Admitting: INTERNAL MEDICINE
Payer: COMMERCIAL

## 2020-10-20 VITALS
DIASTOLIC BLOOD PRESSURE: 93 MMHG | HEART RATE: 79 BPM | SYSTOLIC BLOOD PRESSURE: 111 MMHG | RESPIRATION RATE: 18 BRPM | OXYGEN SATURATION: 97 % | HEIGHT: 72 IN | WEIGHT: 175 LBS | TEMPERATURE: 98 F | BODY MASS INDEX: 23.7 KG/M2

## 2020-10-20 DIAGNOSIS — Z12.11 COLON CANCER SCREENING: ICD-10-CM

## 2020-10-20 DIAGNOSIS — Z01.818 PRE-OP TESTING: Primary | ICD-10-CM

## 2020-10-20 PROCEDURE — 0DBK8ZX EXCISION OF ASCENDING COLON, VIA NATURAL OR ARTIFICIAL OPENING ENDOSCOPIC, DIAGNOSTIC: ICD-10-PCS | Performed by: INTERNAL MEDICINE

## 2020-10-20 PROCEDURE — G0500 MOD SEDAT ENDO SERVICE >5YRS: HCPCS | Performed by: INTERNAL MEDICINE

## 2020-10-20 PROCEDURE — 45385 COLONOSCOPY W/LESION REMOVAL: CPT | Performed by: INTERNAL MEDICINE

## 2020-10-20 PROCEDURE — 0DBP8ZX EXCISION OF RECTUM, VIA NATURAL OR ARTIFICIAL OPENING ENDOSCOPIC, DIAGNOSTIC: ICD-10-PCS | Performed by: INTERNAL MEDICINE

## 2020-10-20 RX ORDER — MIDAZOLAM HYDROCHLORIDE 1 MG/ML
INJECTION INTRAMUSCULAR; INTRAVENOUS
Status: DISCONTINUED | OUTPATIENT
Start: 2020-10-20 | End: 2020-10-20

## 2020-10-20 RX ORDER — SODIUM CHLORIDE 0.9 % (FLUSH) 0.9 %
10 SYRINGE (ML) INJECTION AS NEEDED
Status: DISCONTINUED | OUTPATIENT
Start: 2020-10-20 | End: 2020-10-20

## 2020-10-20 RX ORDER — MIDAZOLAM HYDROCHLORIDE 1 MG/ML
1 INJECTION INTRAMUSCULAR; INTRAVENOUS EVERY 5 MIN PRN
Status: DISCONTINUED | OUTPATIENT
Start: 2020-10-20 | End: 2020-10-20

## 2020-10-20 RX ORDER — SODIUM CHLORIDE, SODIUM LACTATE, POTASSIUM CHLORIDE, CALCIUM CHLORIDE 600; 310; 30; 20 MG/100ML; MG/100ML; MG/100ML; MG/100ML
INJECTION, SOLUTION INTRAVENOUS CONTINUOUS
Status: DISCONTINUED | OUTPATIENT
Start: 2020-10-20 | End: 2020-10-20

## 2020-10-20 NOTE — OPERATIVE REPORT
[facility] Endoscopy Report      Date of Procedure:  10/20/20      Preoperative Diagnosis:  Colorectal cancer screening      Postoperative Diagnosis:  1. Colon polyps  2.   Diverticulosis left colon      Procedure:    Colonoscopy with polyp vascular anomalies or signs of inflammation seen. Retroflexion in the rectum revealed no abnormalities. The procedure was well tolerated without immediate complication. Impression:  1. Colon polyps  2.   Diverticulosis left colon currently uncomplic

## 2020-10-20 NOTE — H&P
History & Physical Examination    Patient Name: Bertha Duncan  MRN: [id number]  CSN: [id number]  YOB: 1955    Diagnosis: Colorectal cancer screening          •  Omeprazole Magnesium 20 MG Oral Tab EC, Take 20 mg by mouth daily. , Disp: , Rfl: , Family History   Problem Relation Age of Onset   • Skin cancer Father    • Heart Disorder Father    • Colon Cancer Mother    • Cancer Mother         liver/lung   • Diabetes Sister    • Heart Disorder Sister    • Skin cancer Brother      Social History

## 2020-10-22 ENCOUNTER — TELEPHONE (OUTPATIENT)
Dept: GASTROENTEROLOGY | Facility: CLINIC | Age: 65
End: 2020-10-22

## 2020-10-22 NOTE — TELEPHONE ENCOUNTER
Entered into Epic:Recall colon in 5 years per Dr. Ra Rosario. Last Colon done 10/20/2020, next due 10/20/2025. HM updated.

## 2020-10-22 NOTE — TELEPHONE ENCOUNTER
----- Message from Markel Judge MD sent at 10/22/2020  5:44 PM CDT -----  I spoke to Andrew Marsh. He is feeling well. He had #2 adenomatous polyps removed including a sessile serrated adenoma. All were subcentimeter.   I have recommended a high-fiber di

## 2020-10-24 RX ORDER — ZOLPIDEM TARTRATE 10 MG/1
TABLET ORAL
Qty: 30 TABLET | Refills: 0 | Status: SHIPPED | OUTPATIENT
Start: 2020-10-24 | End: 2020-12-30

## 2020-10-24 NOTE — TELEPHONE ENCOUNTER
Message noted: Chart reviewed and may refill medication as requested. Script sent to listed pharmacy by secure method.     Pt notified through [facility]

## 2020-11-19 RX ORDER — VALACYCLOVIR HYDROCHLORIDE 500 MG/1
TABLET, FILM COATED ORAL
Qty: 14 TABLET | Refills: 2 | Status: SHIPPED | OUTPATIENT
Start: 2020-11-19 | End: 2022-01-03

## 2020-11-19 NOTE — TELEPHONE ENCOUNTER
Message noted: Chart reviewed and may refill medication as requested times one with 2 additional refills. Prescription sent to listed pharmacy. Pharmacy to notify patient.  Pt notified through [address]

## 2020-12-03 ENCOUNTER — HOSPITAL ENCOUNTER (OUTPATIENT)
Dept: MRI IMAGING | Facility: HOSPITAL | Age: 65
Discharge: HOME | End: 2020-12-03
Attending: INTERNAL MEDICINE
Payer: COMMERCIAL

## 2020-12-03 DIAGNOSIS — D49.0 IPMN (INTRADUCTAL PAPILLARY MUCINOUS NEOPLASM): ICD-10-CM

## 2020-12-03 PROCEDURE — A9575 INJ GADOTERATE MEGLUMI 0.1ML: HCPCS | Performed by: INTERNAL MEDICINE

## 2020-12-03 PROCEDURE — 82565 ASSAY OF CREATININE: CPT

## 2020-12-03 PROCEDURE — 74183 MRI ABD W/O CNTR FLWD CNTR: CPT | Performed by: INTERNAL MEDICINE

## 2020-12-04 ENCOUNTER — TELEPHONE (OUTPATIENT)
Dept: GASTROENTEROLOGY | Facility: CLINIC | Age: 65
End: 2020-12-04

## 2020-12-04 NOTE — TELEPHONE ENCOUNTER
MRI/MRCP recall for 2 years entered per Dr. Kadie Rogers. MRI/MRCP done on 12/3/2020 and due on 12/3/2022.

## 2020-12-04 NOTE — TELEPHONE ENCOUNTER
----- Message from Heidi Collins MD sent at 12/3/2020  7:31 PM CST -----  I spoke to Derek Lorenzo. The MRI reveals a minimal increase in size of the branch duct IPMN from 2.0 x 1.8 cm to 2.0 x 2.2 cm.   There is no abnormal enhancement or pancreatic ductal

## 2020-12-30 RX ORDER — ZOLPIDEM TARTRATE 10 MG/1
TABLET ORAL
Qty: 30 TABLET | Refills: 0 | Status: SHIPPED | OUTPATIENT
Start: 2020-12-30 | End: 2021-03-23

## 2020-12-30 NOTE — TELEPHONE ENCOUNTER
Message noted: Chart reviewed and may refill medication as requested. Script sent to listed pharmacy by secure method.     Pt notified through [facility]

## 2021-03-23 RX ORDER — ZOLPIDEM TARTRATE 10 MG/1
TABLET ORAL
Qty: 30 TABLET | Refills: 0 | Status: SHIPPED | OUTPATIENT
Start: 2021-03-23 | End: 2021-06-16

## 2021-03-23 NOTE — TELEPHONE ENCOUNTER
Message noted: Chart reviewed and may refill medication as requested times one. Prescription sent to listed pharmacy. Pharmacy to notify patient to make appointment for further refills  Pt notified through [facility] SERVICES also.

## 2021-03-25 ENCOUNTER — OFFICE VISIT (OUTPATIENT)
Dept: DERMATOLOGY CLINIC | Facility: CLINIC | Age: 66
End: 2021-03-25
Payer: COMMERCIAL

## 2021-03-25 DIAGNOSIS — L82.1 SEBORRHEIC KERATOSES: ICD-10-CM

## 2021-03-25 DIAGNOSIS — Z85.828 HISTORY OF SKIN CANCER: ICD-10-CM

## 2021-03-25 DIAGNOSIS — L57.0 AK (ACTINIC KERATOSIS): Primary | ICD-10-CM

## 2021-03-25 PROCEDURE — 99213 OFFICE O/P EST LOW 20 MIN: CPT | Performed by: DERMATOLOGY

## 2021-03-25 RX ORDER — AMLODIPINE BESYLATE 5 MG/1
TABLET ORAL
COMMUNITY
Start: 2020-10-19

## 2021-03-25 RX ORDER — ZOLPIDEM TARTRATE 10 MG/1
TABLET ORAL NIGHTLY
COMMUNITY

## 2021-03-29 NOTE — PROGRESS NOTES
Marcello Dhaliwal is a 63year old male. HPI:     CC:  Patient presents with:  Lesion: LOV 6/26/2020. Pt presents with 2 lesions on posterior neck that are dark and different colors. Personal hx of BCC and AKs.         Allergies:  Patient has no known allergies Oral Tab TAKE 1 TABLET BY MOUTH AT BEDTIME AS NEEDED FOR SLEEP 30 tablet 0   • VALACYCLOVIR  MG Oral Tab TAKE 1 TABLET BY MOUTH EVERY 12 HOURS 14 tablet 2   • Omeprazole Magnesium 20 MG Oral Tab EC Take 20 mg by mouth daily.      • amLODIPine Besylat activity: Yes    Other Topics      Concerns:        [place] up [place] a farm: Not Asked        History of tanning: Not Asked        Outdoor occupation: Not Asked        Pt has a pacemaker: No        Pt has a defibrillator: No        Reaction to local anesthetic: filed for this visit. HPI:  Patient presents with:  Lesion: LOV 6/26/2020. Pt presents with 2 lesions on posterior neck that are dark and different colors. Personal hx of BCC and AKs.     Past notes/ records and appropriate/relevant lab results including encounter         Ak (actinic keratosis)  (primary encounter diagnosis)  Seborrheic keratoses  History of skin cancer    BCC right central chest post ED NC 6/26/2020  Actinic keratoses stable extensive benign keratoses.   No recurrence of prior skin cancer seborrheic  keratoses, cherry angiomas:  Reassurance regarding other benign skin lesions. Signs and symptoms of skin cancer, ABCDE's of melanoma discussed with patient. Sunscreen use, sun protection, self exams reviewed.   Followup as noted RTC ---routine ch

## 2021-04-28 ENCOUNTER — OFFICE VISIT (OUTPATIENT)
Dept: DERMATOLOGY CLINIC | Facility: CLINIC | Age: 66
End: 2021-04-28
Payer: COMMERCIAL

## 2021-04-28 DIAGNOSIS — L57.0 AK (ACTINIC KERATOSIS): Primary | ICD-10-CM

## 2021-04-28 DIAGNOSIS — L82.1 SEBORRHEIC KERATOSES: ICD-10-CM

## 2021-04-28 DIAGNOSIS — C44.519 BCC (BASAL CELL CARCINOMA), TRUNK: ICD-10-CM

## 2021-04-28 DIAGNOSIS — D23.30 BENIGN NEOPLASM OF SKIN OF FACE: ICD-10-CM

## 2021-04-28 DIAGNOSIS — Z85.828 HISTORY OF SKIN CANCER: ICD-10-CM

## 2021-04-28 DIAGNOSIS — D22.9 MULTIPLE NEVI: ICD-10-CM

## 2021-04-28 DIAGNOSIS — D23.60 BENIGN NEOPLASM OF SKIN OF UPPER LIMB, INCLUDING SHOULDER, UNSPECIFIED LATERALITY: ICD-10-CM

## 2021-04-28 DIAGNOSIS — D23.5 BENIGN NEOPLASM OF SKIN OF TRUNK, EXCEPT SCROTUM: ICD-10-CM

## 2021-04-28 DIAGNOSIS — D23.4 BENIGN NEOPLASM OF SCALP AND SKIN OF NECK: ICD-10-CM

## 2021-04-28 PROCEDURE — 99213 OFFICE O/P EST LOW 20 MIN: CPT | Performed by: DERMATOLOGY

## 2021-05-03 NOTE — PROGRESS NOTES
Kimmy Mendes is a 63year old male. HPI:     CC:  Patient presents with:  Lesion: LOV 3/25/2021 Patient present with brown flat lesion on L side of temple. Patient notice lesion 2 years . Patient has hx of AK         Allergies:  Patient has no known allerg mouth daily. • fluorouracil 5 % External Cream Use weekly at night as directed x 12 weeks 40 g 1   • Zolpidem Tartrate 10 MG Oral Tab Take by mouth nightly.  (Patient not taking: Reported on 4/28/2021 )     • ZOLPIDEM TARTRATE 10 MG Oral Tab TAKE 1 TABL wine per week        Comment: wine occasdionally      Drug use: Yes        Types: Cannabis      Sexual activity: Yes    Other Topics      Concerns:        Grew up on a farm: Not Asked        History of tanning: Not Asked        Outdoor occupation: [employer] • Heart Disorder Sister    • Skin cancer Sister    • Skin cancer Brother        There were no vitals filed for this visit. HPI:  Patient presents with:  Lesion: LOV 3/25/2021 Patient present with brown flat lesion on L side of temple.  Patient notice l remarkable for lesions as noted on map. Assessment / plan:    No orders of the defined types were placed in this encounter.       Meds & Refills for this Visit:  Requested Prescriptions      No prescriptions requested or ordered in this encounter protection. Retin-A application instructions reviewed. May not be covered patient aware    Extensive lentigines keratoses no other new suspicious lesions    Please refer to map for specific lesions. See additional diagnoses.   Pros cons of various therap

## 2021-06-16 RX ORDER — ZOLPIDEM TARTRATE 10 MG/1
TABLET ORAL
Qty: 30 TABLET | Refills: 0 | Status: SHIPPED | OUTPATIENT
Start: 2021-06-16

## 2021-06-16 NOTE — TELEPHONE ENCOUNTER
Message noted: Chart reviewed and may refill medication as requested times one. Prescription sent to listed pharmacy. Pharmacy to notify patient to make appointment for further refills  Pt notified through [facility] SERVICES also.

## 2021-07-31 ENCOUNTER — OFFICE VISIT (OUTPATIENT)
Dept: DERMATOLOGY CLINIC | Facility: CLINIC | Age: 66
End: 2021-07-31
Payer: COMMERCIAL

## 2021-07-31 DIAGNOSIS — D23.9 BENIGN NEOPLASM OF SKIN, UNSPECIFIED LOCATION: ICD-10-CM

## 2021-07-31 DIAGNOSIS — L57.0 AK (ACTINIC KERATOSIS): ICD-10-CM

## 2021-07-31 DIAGNOSIS — D22.9 MULTIPLE NEVI: ICD-10-CM

## 2021-07-31 DIAGNOSIS — L82.1 SEBORRHEIC KERATOSES: ICD-10-CM

## 2021-07-31 DIAGNOSIS — D48.5 NEOPLASM OF UNCERTAIN BEHAVIOR OF SKIN: Primary | ICD-10-CM

## 2021-07-31 DIAGNOSIS — Z85.828 HISTORY OF SKIN CANCER: ICD-10-CM

## 2021-07-31 PROCEDURE — 11102 TANGNTL BX SKIN SINGLE LES: CPT | Performed by: DERMATOLOGY

## 2021-07-31 PROCEDURE — 99213 OFFICE O/P EST LOW 20 MIN: CPT | Performed by: DERMATOLOGY

## 2021-07-31 PROCEDURE — 88305 TISSUE EXAM BY PATHOLOGIST: CPT | Performed by: DERMATOLOGY

## 2021-07-31 PROCEDURE — 17000 DESTRUCT PREMALG LESION: CPT | Performed by: DERMATOLOGY

## 2021-08-01 NOTE — PROGRESS NOTES
Jah Anders is a 61year old male.   HPI:     CC:  Patient presents with:  Lesion: LOV 04/28/21 pt seen for lesion to his left side of face near temple area, here for lesion to the center of his chest noticed it about a month ago denies any pain, itching o Medications   Medication Sig Dispense Refill   • ZOLPIDEM TARTRATE 10 MG Oral Tab TAKE 1 TABLET BY MOUTH EVERY DAY AT BEDTIME AS NEEDED FOR SLEEP 30 tablet 0   • amLODIPine Besylate 5 MG Oral Tab TAKE 1 TABLET BY MOUTH EVERY DAY     • VALACYCLOVIR  Vaping Use: Never used    Substance and Sexual Activity      Alcohol use:  Yes        Alcohol/week: 4.0 standard drinks        Types: 4 Glasses of wine per week        Comment: wine occasdionally      Drug use: Yes        Types: Cannabis      Sexual activit Age of Onset   • Skin cancer Father    • Heart Disorder Father    • Colon Cancer Mother    • Cancer Mother         liver/lung   • Diabetes Sister    • Heart Disorder Sister    • Skin cancer Sister    • Skin cancer Brother        There were no vitals filed less scattered on exam. pigmented lesions examined with dermoscopy benign-appearing patterns. Waxy tannish keratotic papules scattered, cherry-red vascular papules scattered. See map today's date for lesions noted .   See assessment and plan below fo plaques over lateral cheeks, temples temporal scalp reassurance regarding benign keratoses consider cryo. Multiple benign keratoses including left ear, temples forehead.   Reviewed tretinoin instructions  2-3 times weekly, moisturizer with sunscreen daily resulting from errors in recognition.     Encounter Times  Including precharting, reviewing chart, prior notes obtaining history: 5 minutes, medical exam :10 minutes, notes on body map, plan, counseling 10minutes My total time spent caring for the patient o

## 2021-08-03 NOTE — PROGRESS NOTES
The pathology report from last visit showed  left chest Seborrheic keratosis, inflamed. Please log in test results, send biopsy results letter. Pt to rtc 4 mos or prn.

## 2021-12-27 ENCOUNTER — OFFICE VISIT (OUTPATIENT)
Dept: DERMATOLOGY CLINIC | Facility: CLINIC | Age: 66
End: 2021-12-27
Payer: COMMERCIAL

## 2021-12-27 DIAGNOSIS — L57.0 AK (ACTINIC KERATOSIS): ICD-10-CM

## 2021-12-27 DIAGNOSIS — L82.1 SEBORRHEIC KERATOSES: ICD-10-CM

## 2021-12-27 DIAGNOSIS — D22.9 MULTIPLE NEVI: ICD-10-CM

## 2021-12-27 DIAGNOSIS — Z85.828 HISTORY OF SKIN CANCER: ICD-10-CM

## 2021-12-27 DIAGNOSIS — D48.5 NEOPLASM OF UNCERTAIN BEHAVIOR OF SKIN: Primary | ICD-10-CM

## 2021-12-27 PROCEDURE — 99213 OFFICE O/P EST LOW 20 MIN: CPT | Performed by: DERMATOLOGY

## 2021-12-27 PROCEDURE — 17003 DESTRUCT PREMALG LES 2-14: CPT | Performed by: DERMATOLOGY

## 2021-12-27 PROCEDURE — 11102 TANGNTL BX SKIN SINGLE LES: CPT | Performed by: DERMATOLOGY

## 2021-12-27 PROCEDURE — 88305 TISSUE EXAM BY PATHOLOGIST: CPT | Performed by: DERMATOLOGY

## 2021-12-27 PROCEDURE — 17000 DESTRUCT PREMALG LESION: CPT | Performed by: DERMATOLOGY

## 2022-01-03 RX ORDER — VALACYCLOVIR HYDROCHLORIDE 500 MG/1
TABLET, FILM COATED ORAL
Qty: 14 TABLET | Refills: 2 | Status: SHIPPED | OUTPATIENT
Start: 2022-01-03

## 2022-01-03 NOTE — TELEPHONE ENCOUNTER
Message noted: Chart reviewed and may refill medication as requested. Prescription sent to listed pharmacy. Pharmacy to notify patient.  Pt notified through [facility]

## 2022-01-04 NOTE — PROGRESS NOTES
The pathology report from last visit showed [address] right lat cheek. Suggest Mohs he has seen Dr. Alondra Kruse previously--ok to send to Dr. Alondra Kruse at [facility] if pt ok with this. Please log in test results.   Please call patient and inform of results and recommendations.  (pl

## 2022-01-05 NOTE — PROGRESS NOTES
Path logged in. Patient informed of test results and all KMT's recommendations. Voiced understanding.  Path routed to Dr. Eugenia Case, his contact info given to pt, pt will CB for F/U with [facility]

## 2022-01-06 ENCOUNTER — TELEPHONE (OUTPATIENT)
Dept: DERMATOLOGY CLINIC | Facility: CLINIC | Age: 67
End: 2022-01-06

## 2022-01-06 NOTE — TELEPHONE ENCOUNTER
Patient needs biopsy results sent over to Dr. Mela Barnes. They have not received them.     Fax: [phone]

## 2022-01-10 NOTE — PROGRESS NOTES
Operative Report                     Shave/  Tangential biopsy     Clinical diagnosis:    Size of lesion:  pt with hx skin cancer ak's with tender nodule non-healing  Spec 1 Description >>>>>: rigth lateral cheek  Spec 1 Comment: r/o BCC 6mm pearly p

## 2022-08-20 ENCOUNTER — HOSPITAL ENCOUNTER (EMERGENCY)
Facility: HOSPITAL | Age: 67
Discharge: HOME | End: 2022-08-20
Attending: EMERGENCY MEDICINE
Payer: MEDICARE

## 2022-08-20 ENCOUNTER — APPOINTMENT (OUTPATIENT)
Dept: GENERAL RADIOLOGY | Facility: HOSPITAL | Age: 67
End: 2022-08-20
Attending: EMERGENCY MEDICINE
Payer: MEDICARE

## 2022-08-20 VITALS
SYSTOLIC BLOOD PRESSURE: 144 MMHG | BODY MASS INDEX: 24.38 KG/M2 | HEART RATE: 61 BPM | DIASTOLIC BLOOD PRESSURE: 86 MMHG | TEMPERATURE: 99 F | OXYGEN SATURATION: 99 % | HEIGHT: 72 IN | WEIGHT: 180 LBS | RESPIRATION RATE: 18 BRPM

## 2022-08-20 DIAGNOSIS — S60.459A FOREIGN BODY IN SKIN OF FINGER, INITIAL ENCOUNTER: Primary | ICD-10-CM

## 2022-08-20 PROCEDURE — 99283 EMERGENCY DEPT VISIT LOW MDM: CPT

## 2022-08-20 PROCEDURE — 73130 X-RAY EXAM OF HAND: CPT | Performed by: EMERGENCY MEDICINE

## 2022-08-20 RX ORDER — LIDOCAINE HYDROCHLORIDE 10 MG/ML
20 INJECTION, SOLUTION EPIDURAL; INFILTRATION; INTRACAUDAL; PERINEURAL ONCE
Status: COMPLETED | OUTPATIENT
Start: 2022-08-20 | End: 2022-08-20

## 2022-08-20 RX ORDER — AMOXICILLIN AND CLAVULANATE POTASSIUM 875; 125 MG/1; MG/1
1 TABLET, FILM COATED ORAL 2 TIMES DAILY
Qty: 20 TABLET | Refills: 0 | Status: SHIPPED | OUTPATIENT
Start: 2022-08-20 | End: 2022-08-30

## 2022-08-20 RX ORDER — AMOXICILLIN AND CLAVULANATE POTASSIUM 875; 125 MG/1; MG/1
875 TABLET, FILM COATED ORAL ONCE
Status: COMPLETED | OUTPATIENT
Start: 2022-08-20 | End: 2022-08-20

## 2022-10-26 ENCOUNTER — OFFICE VISIT (OUTPATIENT)
Dept: DERMATOLOGY CLINIC | Facility: CLINIC | Age: 67
End: 2022-10-26
Payer: MEDICARE

## 2022-10-26 DIAGNOSIS — L57.8 SUN-DAMAGED SKIN: ICD-10-CM

## 2022-10-26 DIAGNOSIS — L82.1 SEBORRHEIC KERATOSES: ICD-10-CM

## 2022-10-26 DIAGNOSIS — D22.9 MULTIPLE NEVI: ICD-10-CM

## 2022-10-26 DIAGNOSIS — L57.0 AK (ACTINIC KERATOSIS): Primary | ICD-10-CM

## 2022-10-26 DIAGNOSIS — Z85.828 HISTORY OF SKIN CANCER: ICD-10-CM

## 2022-10-26 DIAGNOSIS — L81.4 LENTIGO: ICD-10-CM

## 2022-10-26 DIAGNOSIS — D23.9 BENIGN NEOPLASM OF SKIN, UNSPECIFIED LOCATION: ICD-10-CM

## 2022-10-26 PROCEDURE — 99213 OFFICE O/P EST LOW 20 MIN: CPT | Performed by: DERMATOLOGY

## 2022-10-26 RX ORDER — ROSUVASTATIN CALCIUM 20 MG/1
20 TABLET, COATED ORAL DAILY
COMMUNITY
Start: 2022-09-13

## 2022-10-26 RX ORDER — TRETINOIN 1 MG/G
1 CREAM TOPICAL NIGHTLY
Qty: 20 G | Refills: 3 | Status: SHIPPED | OUTPATIENT
Start: 2022-10-26

## 2022-11-14 ENCOUNTER — TELEPHONE (OUTPATIENT)
Facility: CLINIC | Age: 67
End: 2022-11-14

## 2022-11-14 DIAGNOSIS — D49.0 IPMN (INTRADUCTAL PAPILLARY MUCINOUS NEOPLASM): Primary | ICD-10-CM

## 2022-11-14 NOTE — TELEPHONE ENCOUNTER
2 year recall for MEHRAN/MRCP. Last done on 12/3/20 and next due on 12/3/22. Please review and sign off if appropriate. Thank you.

## 2022-11-14 NOTE — TELEPHONE ENCOUNTER
Patient outreach message received:    MRI/MRCP recall for 2 years entered per Dr. Keith Davis. MRI/MRCP done on 12/3/2020 and due on 12/3/2022.

## 2022-11-14 NOTE — TELEPHONE ENCOUNTER
I have entered the order for the MRI/MRCP.   Yuan Saida should also make a follow-up appointment in the office at his convenience as he was last seen in 2020

## 2022-11-14 NOTE — TELEPHONE ENCOUNTER
Spoke to patient and relayed Dr. Deven Mathews message as shown below. Appointment offered and scheduled. No further action required at this time.

## 2022-12-15 ENCOUNTER — HOSPITAL ENCOUNTER (OUTPATIENT)
Dept: MRI IMAGING | Facility: HOSPITAL | Age: 67
Discharge: HOME | End: 2022-12-15
Attending: INTERNAL MEDICINE
Payer: MEDICARE

## 2022-12-15 DIAGNOSIS — D49.0 IPMN (INTRADUCTAL PAPILLARY MUCINOUS NEOPLASM): ICD-10-CM

## 2022-12-15 LAB
CREAT BLD-MCNC: 0.9 MG/DL
GFR SERPLBLD BASED ON 1.73 SQ M-ARVRAT: 95 ML/MIN/1.73M2 (ref 60–?)

## 2022-12-15 PROCEDURE — A9575 INJ GADOTERATE MEGLUMI 0.1ML: HCPCS | Performed by: INTERNAL MEDICINE

## 2022-12-15 PROCEDURE — 82565 ASSAY OF CREATININE: CPT

## 2022-12-15 PROCEDURE — 74183 MRI ABD W/O CNTR FLWD CNTR: CPT | Performed by: INTERNAL MEDICINE

## 2022-12-15 RX ORDER — GADOTERATE MEGLUMINE 376.9 MG/ML
20 INJECTION INTRAVENOUS
Status: COMPLETED | OUTPATIENT
Start: 2022-12-15 | End: 2022-12-15

## 2022-12-15 RX ADMIN — GADOTERATE MEGLUMINE 17 ML: 376.9 INJECTION INTRAVENOUS at 09:11:00

## 2022-12-20 ENCOUNTER — TELEPHONE (OUTPATIENT)
Dept: GASTROENTEROLOGY | Facility: CLINIC | Age: 67
End: 2022-12-20

## 2022-12-21 NOTE — TELEPHONE ENCOUNTER
----- Message from Yosi Waggoner MD sent at 12/17/2022  2:03 PM CST -----  Horatio Collet,    I trust that you are feeling well. 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 We will continue to monitor the cyst by MRI. We can discuss a 1 versus 2-year interval at your upcoming appointment in March. If I can answer any questions regarding the above, please do not hesitate to contact me. Sincerely,    Dr. Jermaine Molina RNs: Please enter MRI/MRCP recall for 1 year.

## 2023-03-09 ENCOUNTER — OFFICE VISIT (OUTPATIENT)
Dept: DERMATOLOGY CLINIC | Facility: CLINIC | Age: 68
End: 2023-03-09

## 2023-03-09 DIAGNOSIS — D23.9 BENIGN NEOPLASM OF SKIN, UNSPECIFIED LOCATION: ICD-10-CM

## 2023-03-09 DIAGNOSIS — L57.0 AK (ACTINIC KERATOSIS): Primary | ICD-10-CM

## 2023-03-09 DIAGNOSIS — Z85.828 HISTORY OF NONMELANOMA SKIN CANCER: ICD-10-CM

## 2023-03-09 DIAGNOSIS — L81.4 LENTIGO: ICD-10-CM

## 2023-03-09 DIAGNOSIS — D22.9 MULTIPLE NEVI: ICD-10-CM

## 2023-03-09 DIAGNOSIS — L82.1 SEBORRHEIC KERATOSES: ICD-10-CM

## 2023-03-09 PROCEDURE — 99214 OFFICE O/P EST MOD 30 MIN: CPT | Performed by: DERMATOLOGY

## 2023-03-27 ENCOUNTER — OFFICE VISIT (OUTPATIENT)
Facility: CLINIC | Age: 68
End: 2023-03-27

## 2023-03-27 VITALS
SYSTOLIC BLOOD PRESSURE: 120 MMHG | BODY MASS INDEX: 24.52 KG/M2 | DIASTOLIC BLOOD PRESSURE: 77 MMHG | HEIGHT: 72 IN | WEIGHT: 181 LBS | HEART RATE: 69 BPM

## 2023-03-27 DIAGNOSIS — Z86.010 HISTORY OF COLON POLYPS: ICD-10-CM

## 2023-03-27 DIAGNOSIS — D49.0 IPMN (INTRADUCTAL PAPILLARY MUCINOUS NEOPLASM): Primary | ICD-10-CM

## 2023-03-27 PROCEDURE — 99213 OFFICE O/P EST LOW 20 MIN: CPT | Performed by: INTERNAL MEDICINE

## 2023-03-27 RX ORDER — TAMSULOSIN HYDROCHLORIDE 0.4 MG/1
CAPSULE ORAL
COMMUNITY
Start: 2023-03-14

## 2023-04-01 ENCOUNTER — TELEPHONE (OUTPATIENT)
Facility: CLINIC | Age: 68
End: 2023-04-01

## 2023-04-01 NOTE — PATIENT INSTRUCTIONS
1.  Stop smoking. 2.  Repeat MRI of the pancreas in June 2024.  3.  You are due for a colonoscopy in October 2025. 4.  Please contact me with abdominal pain, weight loss or any other digestive symptoms.

## 2023-08-01 ENCOUNTER — HOSPITAL ENCOUNTER (EMERGENCY)
Facility: HOSPITAL | Age: 68
Discharge: HOME | End: 2023-08-01
Attending: EMERGENCY MEDICINE
Payer: MEDICARE

## 2023-08-01 ENCOUNTER — APPOINTMENT (OUTPATIENT)
Dept: GENERAL RADIOLOGY | Facility: HOSPITAL | Age: 68
End: 2023-08-01
Attending: EMERGENCY MEDICINE
Payer: MEDICARE

## 2023-08-01 VITALS
OXYGEN SATURATION: 100 % | TEMPERATURE: 98 F | DIASTOLIC BLOOD PRESSURE: 88 MMHG | SYSTOLIC BLOOD PRESSURE: 150 MMHG | RESPIRATION RATE: 18 BRPM | HEART RATE: 82 BPM

## 2023-08-01 DIAGNOSIS — S59.909A: Primary | ICD-10-CM

## 2023-08-01 PROCEDURE — 99284 EMERGENCY DEPT VISIT MOD MDM: CPT

## 2023-08-01 PROCEDURE — 73080 X-RAY EXAM OF ELBOW: CPT | Performed by: EMERGENCY MEDICINE

## 2023-08-01 RX ORDER — LIDOCAINE HCL/EPINEPHRINE/PF 2%-1:200K
20 VIAL (ML) INJECTION ONCE
Status: COMPLETED | OUTPATIENT
Start: 2023-08-01 | End: 2023-08-01

## 2023-08-01 RX ORDER — CEPHALEXIN 500 MG/1
500 CAPSULE ORAL 4 TIMES DAILY
Qty: 40 CAPSULE | Refills: 0 | Status: SHIPPED | OUTPATIENT
Start: 2023-08-01 | End: 2023-08-11

## 2023-09-27 ENCOUNTER — OFFICE VISIT (OUTPATIENT)
Dept: DERMATOLOGY CLINIC | Facility: CLINIC | Age: 68
End: 2023-09-27

## 2023-09-27 DIAGNOSIS — L82.1 SEBORRHEIC KERATOSES: ICD-10-CM

## 2023-09-27 DIAGNOSIS — D23.9 BENIGN NEOPLASM OF SKIN, UNSPECIFIED LOCATION: ICD-10-CM

## 2023-09-27 DIAGNOSIS — Z85.828 HISTORY OF NONMELANOMA SKIN CANCER: ICD-10-CM

## 2023-09-27 DIAGNOSIS — L57.0 AK (ACTINIC KERATOSIS): ICD-10-CM

## 2023-09-27 DIAGNOSIS — D22.9 MULTIPLE NEVI: ICD-10-CM

## 2023-09-27 DIAGNOSIS — D48.5 NEOPLASM OF UNCERTAIN BEHAVIOR OF SKIN: Primary | ICD-10-CM

## 2023-09-27 DIAGNOSIS — L81.4 LENTIGO: ICD-10-CM

## 2023-09-27 PROCEDURE — 88305 TISSUE EXAM BY PATHOLOGIST: CPT | Performed by: DERMATOLOGY

## 2023-09-30 NOTE — PROGRESS NOTES
The pathology report from last visit showed  right anterior neck, shave biopsy:  -Verrucal keratosis    . Please log in test results, send biopsy results letter. Pt to rtc as planned or prn.

## 2023-10-08 NOTE — PROGRESS NOTES
Wade Broussard is a 79year old male. HPI:     CC:  Patient presents with:  Derm Problem: LOV 3/9/23. Pt has hx of Aks and BCC. Pt present with a raised spot of concern on the right side of neck x 1 month has been growing, no symptoms. Allergies:  Patient has no known allergies. HISTORY:    Past Medical History:   Diagnosis Date    Actinic keratosis     Basal cell carcinoma     BCC (basal cell carcinoma) 06/2020    Right central chest    BCC (basal cell carcinoma) 2021    right lateral cheek    Chicken pox     Clear cell tumor 2019    left anterior pinna    High blood pressure     Measles     Mumps     Pancreatitis 2010    Physical exam 2004    Last, done by Dr. Demetrice Santiago adenoma [facility])     tsh    Pituitary tumor 12/2001    Sinus disorder     sinus problems    Skin cancer 2-1-16    BCC L ant. hairline      Past Surgical History:   Procedure Laterality Date    CHOLECYSTECTOMY      COLONOSCOPY      COLONOSCOPY      COLONOSCOPY N/A 10/20/2020    Procedure: COLONOSCOPY;  Surgeon: Noris Camarena MD;  Location: [facility] ENDOSCOPY    OTHER SURGICAL HISTORY  12/1/2001    pituitary tumor removed      Family History   Problem Relation Age of Onset    Skin cancer Father     Heart Disorder Father     Colon Cancer Mother     Cancer Mother         liver/lung    Diabetes Sister     Heart Disorder Sister     Skin cancer Sister     Skin cancer Brother       Social History     Socioeconomic History    Marital status:    Tobacco Use    Smoking status: Every Day     Packs/day: .5     Types: Cigarettes    Smokeless tobacco: Never    Tobacco comments:     1 unit per day for 30 years   Vaping Use    Vaping Use: Never used   Substance and Sexual Activity    Alcohol use:  Yes     Alcohol/week: 4.0 standard drinks of alcohol     Types: 4 Glasses of wine per week     Comment: wine occasdionally    Drug use: Yes     Types: Cannabis    Sexual activity: Yes   Other Topics Concern    Pt has a pacemaker No    Pt has a defibrillator No    Reaction to local anesthetic No    Caffeine Concern Yes     Comment: coffee-2 cups/day        Current Outpatient Medications   Medication Sig Dispense Refill    tamsulosin 0.4 MG Oral Cap Take 30 minutes after a meal at the same time every day      rosuvastatin 20 MG Oral Tab Take 1 tablet (20 mg total) by mouth daily. Tretinoin 0.1 % External Cream Apply 1 Application topically nightly. Use small amount to affected areas 20 g 3    VALACYCLOVIR 500 MG Oral Tab TAKE 1 TABLET BY MOUTH EVERY 12 HOURS 14 tablet 2    amLODIPine Besylate 5 MG Oral Tab TAKE 1 TABLET BY MOUTH EVERY DAY      ZOLPIDEM TARTRATE 10 MG Oral Tab TAKE 1 TABLET BY MOUTH EVERY DAY AT BEDTIME AS NEEDED FOR SLEEP 30 tablet 0    Zolpidem Tartrate 10 MG Oral Tab Take by mouth nightly. (Patient not taking: Reported on 4/28/2021 )      Omeprazole Magnesium 20 MG Oral Tab EC Take 20 mg by mouth daily. amLODIPine Besylate 5 MG Oral Tab Take 5 mg by mouth daily.       fluorouracil 5 % External Cream Use weekly at night as directed x 12 weeks 40 g 1     Allergies:   No Known Allergies    Past Medical History:   Diagnosis Date    Actinic keratosis     Basal cell carcinoma     BCC (basal cell carcinoma) 06/2020    Right central chest    BCC (basal cell carcinoma) 2021    right lateral cheek    Chicken pox     Clear cell tumor 2019    left anterior pinna    High blood pressure     Measles     Mumps     Pancreatitis 2010    Physical exam 2004    Last, done by Dr. Demetrice Santiago adenoma [facility])     tsh    Pituitary tumor 12/2001    Sinus disorder     sinus problems    Skin cancer 2-1-16    [facility]     Past Surgical History:   Procedure Laterality Date    CHOLECYSTECTOMY      COLONOSCOPY      COLONOSCOPY      COLONOSCOPY N/A 10/20/2020    Procedure: COLONOSCOPY;  Surgeon: Noris Camarena MD;  Location: [address] [place] [address] ENDOSCOPY    OTHER SURGICAL HISTORY  12/1/2001    pituitary tumor removed     Social History    Socioeconomic History      Marital status:       Spouse name: Not on file      Number of children: Not on file      Years of education: Not on file      Highest education level: Not on file    Occupational History      Not on file    Tobacco Use      Smoking status: Every Day        Packs/day: .5        Types: Cigarettes      Smokeless tobacco: Never      Tobacco comments: 1 unit per day for 30 years    Vaping Use      Vaping Use: Never used    Substance and Sexual Activity      Alcohol use:  Yes        Alcohol/week: 4.0 standard drinks of alcohol        Types: 4 Glasses of wine per week        Comment: wine occasdionally      Drug use: Yes        Types: Cannabis      Sexual activity: Yes    Other Topics      Concerns:        Grew up on a farm: Not Asked        History of tanning: Not Asked        Outdoor occupation: Not Asked        Pt has a pacemaker: No        Pt has a defibrillator: No        Reaction to local anesthetic: No         Service: Not Asked        Blood Transfusions: Not Asked        Caffeine Concern: Yes          coffee-2 cups/day        Occupational Exposure: Not Asked        Hobby Hazards: Not Asked        Sleep Concern: Not Asked        Stress Concern: Not Asked        Weight Concern: Not Asked        Special Diet: Not Asked        Back Care: Not Asked        Exercise: Not Asked        Bike Helmet: Not Asked        Seat Belt: Not Asked        Self-Exams: Not Asked    Social History Narrative      Not on file    Social Determinants of Health  Financial Resource Strain: Not on file  Food Insecurity: Not on file  Transportation Needs: Not on file  Physical Activity: Not on file  Stress: Not on file  Social Connections: Not on file  Housing Stability: Not on file  Family History   Problem Relation Age of Onset    Skin cancer Father     Heart Disorder Father     Colon Cancer Mother     Cancer Mother         liver/lung    Diabetes Sister     Heart Disorder Sister     Skin cancer Sister     Skin cancer Brother        There were no vitals filed for this visit. HPI:  Patient presents with:  Derm Problem: LOV 3/9/23. Pt has hx of Aks and BCC. Pt present with a raised spot of concern on the right side of neck x 1 month has been growing, no symptoms. Follow-up history AK's, BCC clinically atypical nevi, multiple benign keratoses. Family history of skin cancer. Has been fishing over the summer. Several fishhook injuries which she is recuperating from patient outdoors a fair amount has been careful to wear hat less consistent with sunscreen. Uses tretinoin off-and-on  Past notes/ records and appropriate/relevant lab results including pathology and past body maps reviewed. Updated and new information noted in current visit. Has used the Zyclara and Efudex. Previously    Careful sun protection while outdoors fishing not coughing as much this year. history BCC, clear cell tumor from left ear status post Mohs surgery. Right central chest BCC ENC at visit 6/26/2020  Has been very careful use sunscreen. Has not been out golfing yet very much due to the weather  Patient presents with concerns above. Patient has been in their usual state of health. History, medications, allergies reviewed as noted. ROS:  Denies any other systemic complaints. No new or changeing lesions other than noted above. No fevers, chills, night sweats, unusual sun sensitivity. No other skin complaints. History, medications, allergies reviewed as noted. Physical Examination:     Well-developed well-nourished patient alert oriented in no acute distress. Exam performed, including scalp, head, neck, face,nails, hair, external eyes, including conjunctival mucosa, eyelids, lips external ears , arms, digits,palms. Multiple light to medium brown, well marginated, uniformly pigmented, macules and papules 6 mm and less scattered on exam. pigmented lesions examined with dermoscopy benign-appearing patterns.      Waxy tannish keratotic papules scattered, cherry-red vascular papules scattered. See map today's date for lesions noted . See assessment and plan below for specific lesions. Otherwise remarkable for lesions as noted on map. Assessment / plan:    Orders Placed This Encounter      Pathology Tissue P      Meds & Refills for this Visit:  Requested Prescriptions      No prescriptions requested or ordered in this encounter         Neoplasm of uncertain behavior of skin  (primary encounter diagnosis)  Ak (actinic keratosis)  Multiple nevi  Benign neoplasm of skin, unspecified location  Lentigo  Seborrheic keratoses  History of nonmelanoma skin cancer      pt with new tender lesion Spec 1 Description >>>>>: right anterior neck Spec 1 Comment: r/o SCC keratotic papule 5x6mm tan brown on erythematous base   Shave/ tangential biopsy performed, operative note and consent in chart further plans pending pathology      Erythematous scaling keratotic papules noted at sites noted on map  Actinic Keratoses. Precancerous nature discussed. Sun protection, sunscreen/ blocks encouraged Lesions treated with cryo- . Biopsy if not resolved. Anterior forehead along the hairline preauricular cheek left helical rim right preauricular cheekx5  Consider repeating fluorouracil has tolerated this previously use on spots    BCC s/p Mohs right lateral cheek. Healing well. History of AK's overall is doing fairly well. Background actinic damage changes with splotchy dyschromia some erythematous patches and tan-brown patches. Discussed sun protection. Will use vitamin C serum twice daily, tretinoin nightly increasing nightly as tolerated, will use tretinoin on the ears may use this to left ear scaling plaque more consistently. This may help with the background AK's. Will use instead of fluorouracil he has not started this. Very concerned regarding irritation crusting redness.     Waxy tan papules at left temporal scalp seborrheic keratoses treat with cryo. Smaller AK's over the forearms right in particular. Overall doing well  At risk for additional AK's. Still recommend field treatment of recurrence and image changes    For background of sun damage early AK's tretinoin 0.1% cream nightly as tolerated along with sun protection and consider repeat course of fluorouracil. Currently no new suspicious lesions. No atypical appearing pigmented lesions      Areas of sebaceous hyperplasia, waxy tan papules left lateral cheek reassurance consider trial of cryo    Other benign keratoses continue careful monitoring with history of AK's over the temples and forehead no other suspicious lesions on todays exam    BCC right central chest post electrodesiccation curettage 6/26/2020  Actinic keratoses stable extensive benign keratoses. No recurrence of prior skin cancer      Patient with history of skin cancer. No evidence of recurrence. Lesions of concern tan-brown papules at posterior neck seborrheic keratoses resolved. Intradermal nevus posterior neck unchanged    Waxy tan plaques over lateral cheeks, temples temporal scalp reassurance regarding benign keratoses consider cryo. Multiple benign keratoses including left ear, temples forehead. 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 Observe carefully    Continue careful sun protection. Multiple other benign keratoses lentigines nevi no new suspicious lesions    Sun damage, extensive keratoses over the face cheeks brow. Discussed possible Retin-A as chemoprevention. Reviewed over-the-counter products as well. So this may help with some dyschromia from cryo as well. Careful sun protection. Retin-A application instructions reviewed.   May not be covered patient aware    Extensive lentigines keratoses no other new suspicious lesions    Please refer to map for specific lesions. See additional diagnoses. Pros cons of various therapies, risks benefits discussed. Pathophysiology discussed with patient. Therapeutic options reviewed. See  Medications in grid. Instructions reviewed at length. Benign nevi, seborrheic  keratoses, cherry angiomas:  Reassurance regarding other benign skin lesions. Signs and symptoms of skin cancer, ABCDE's of melanoma discussed with patient. Sunscreen use, sun protection, self exams reviewed. Followup as noted RTC ---routine checkup    6 mos -one year or p.r.n. No other new suspicious lesions plan recheck 3 months or as needed. Patient aware to continue careful sun protection while he is out golfing and fishing over the summer. The patient indicates understanding of these issues and agrees to the plan. The patient is asked to return as noted in follow-up/ above. This note was generated using Dragon voice recognition software. Please contact me regarding any confusion resulting from errors in recognition.     Encounter Times  Including precharting, reviewing chart, prior notes obtaining history: 5 minutes, medical exam :10 minutes, notes on body map, plan, counseling 10minutes My total time spent caring for the patient on the day of the encounter: 25 minutes

## 2023-10-08 NOTE — PROGRESS NOTES
Operative Report                     Shave/  Tangential biopsy     Clinical diagnosis:    Size of lesion:    Location:pt with new tender lesion Spec 1 Description >>>>>: right anterior neck Spec 1 Comment: r/o SCC keratotic papule 5x6mm tan brown on erythematous base     Procedure: With patient in appropriate position the skin of the above was scrubbed with alcohol. Anesthesia was obtained with 1% Xylocaine with epinephrine. The skin surrounding the lesion was placed under tension and the lesion was incised using a #15 scalpel blade. The specimen was sent for histopathologic exam.    Hemostasis was obtained with electrocautery/aluminum chloride. Estimated blood loss less than 2 cc. Biopsy dressed with Polysporin, bandage. Pressure dressing:   No    Complications: None    Written instructions given and reviewed with patient    Await pathology    Contact information reviewed.     Procedural physician:  Naima Perez MD

## 2023-10-18 ENCOUNTER — APPOINTMENT (OUTPATIENT)
Dept: MRI IMAGING | Facility: HOSPITAL | Age: 68
End: 2023-10-18
Attending: EMERGENCY MEDICINE
Payer: MEDICARE

## 2023-10-18 ENCOUNTER — HOSPITAL ENCOUNTER (EMERGENCY)
Facility: HOSPITAL | Age: 68
Discharge: HOME | End: 2023-10-18
Attending: EMERGENCY MEDICINE
Payer: MEDICARE

## 2023-10-18 VITALS
SYSTOLIC BLOOD PRESSURE: 151 MMHG | DIASTOLIC BLOOD PRESSURE: 89 MMHG | HEART RATE: 65 BPM | BODY MASS INDEX: 24.38 KG/M2 | TEMPERATURE: 98 F | RESPIRATION RATE: 11 BRPM | OXYGEN SATURATION: 99 % | HEIGHT: 72 IN | WEIGHT: 180 LBS

## 2023-10-18 DIAGNOSIS — G45.9 TIA DUE TO EMBOLISM (HCC): Primary | ICD-10-CM

## 2023-10-18 DIAGNOSIS — I74.9 TIA DUE TO EMBOLISM (HCC): Primary | ICD-10-CM

## 2023-10-18 LAB
ANION GAP SERPL CALC-SCNC: 7 MMOL/L (ref 0–18)
ATRIAL RATE: 64 BPM
BASOPHILS # BLD AUTO: 0.04 X10(3) UL (ref 0–0.2)
BASOPHILS NFR BLD AUTO: 0.6 %
BUN BLD-MCNC: 15 MG/DL (ref 7–18)
BUN/CREAT SERPL: 14.3 (ref 10–20)
CALCIUM BLD-MCNC: 9.2 MG/DL (ref 8.5–10.1)
CHLORIDE SERPL-SCNC: 105 MMOL/L (ref 98–112)
CO2 SERPL-SCNC: 30 MMOL/L (ref 21–32)
CREAT BLD-MCNC: 1.05 MG/DL
DEPRECATED RDW RBC AUTO: 41.3 FL (ref 35.1–46.3)
EGFRCR SERPLBLD CKD-EPI 2021: 78 ML/MIN/1.73M2 (ref 60–?)
EOSINOPHIL # BLD AUTO: 0.16 X10(3) UL (ref 0–0.7)
EOSINOPHIL NFR BLD AUTO: 2.3 %
ERYTHROCYTE [DISTWIDTH] IN BLOOD BY AUTOMATED COUNT: 12.6 % (ref 11–15)
GLUCOSE BLD-MCNC: 104 MG/DL (ref 70–99)
HCT VFR BLD AUTO: 43.6 %
HGB BLD-MCNC: 14.9 G/DL
IMM GRANULOCYTES # BLD AUTO: 0.02 X10(3) UL (ref 0–1)
IMM GRANULOCYTES NFR BLD: 0.3 %
LYMPHOCYTES # BLD AUTO: 1.66 X10(3) UL (ref 1–4)
LYMPHOCYTES NFR BLD AUTO: 24 %
MCH RBC QN AUTO: 30.9 PG (ref 26–34)
MCHC RBC AUTO-ENTMCNC: 34.2 G/DL (ref 31–37)
MCV RBC AUTO: 90.5 FL
MONOCYTES # BLD AUTO: 0.38 X10(3) UL (ref 0.1–1)
MONOCYTES NFR BLD AUTO: 5.5 %
NEUTROPHILS # BLD AUTO: 4.67 X10 (3) UL (ref 1.5–7.7)
NEUTROPHILS # BLD AUTO: 4.67 X10(3) UL (ref 1.5–7.7)
NEUTROPHILS NFR BLD AUTO: 67.3 %
OSMOLALITY SERPL CALC.SUM OF ELEC: 295 MOSM/KG (ref 275–295)
P AXIS: 38 DEGREES
P-R INTERVAL: 136 MS
PLATELET # BLD AUTO: 252 10(3)UL (ref 150–450)
POTASSIUM SERPL-SCNC: 4.1 MMOL/L (ref 3.5–5.1)
Q-T INTERVAL: 384 MS
QRS DURATION: 78 MS
QTC CALCULATION (BEZET): 396 MS
R AXIS: -30 DEGREES
RBC # BLD AUTO: 4.82 X10(6)UL
SODIUM SERPL-SCNC: 142 MMOL/L (ref 136–145)
T AXIS: 38 DEGREES
VENTRICULAR RATE: 64 BPM
WBC # BLD AUTO: 6.9 X10(3) UL (ref 4–11)

## 2023-10-18 PROCEDURE — 99284 EMERGENCY DEPT VISIT MOD MDM: CPT

## 2023-10-18 PROCEDURE — 70551 MRI BRAIN STEM W/O DYE: CPT | Performed by: EMERGENCY MEDICINE

## 2023-10-18 PROCEDURE — 93005 ELECTROCARDIOGRAM TRACING: CPT

## 2023-10-18 PROCEDURE — 85025 COMPLETE CBC W/AUTO DIFF WBC: CPT | Performed by: EMERGENCY MEDICINE

## 2023-10-18 PROCEDURE — 80048 BASIC METABOLIC PNL TOTAL CA: CPT | Performed by: EMERGENCY MEDICINE

## 2023-10-18 PROCEDURE — 99285 EMERGENCY DEPT VISIT HI MDM: CPT

## 2023-10-18 PROCEDURE — 93010 ELECTROCARDIOGRAM REPORT: CPT

## 2023-10-18 PROCEDURE — 36415 COLL VENOUS BLD VENIPUNCTURE: CPT

## 2023-10-18 NOTE — ED INITIAL ASSESSMENT (HPI)
Pt sts he was golfing today and began having tingling to L finger then up to hand and lower arm, sts he began slurring speech   Continues to c/o numbness to hand, soreness to arm and to a lesser extent his leg  Currently a/ox4, respirations unlabored, speech clear, face symmetrical, no focal weakness,  strong bilat, gait steady. Equal sensation bilaterally.    Later reports mild chest pain PTA

## 2023-10-19 ENCOUNTER — TELEPHONE (OUTPATIENT)
Dept: NEUROLOGY | Facility: CLINIC | Age: 68
End: 2023-10-19

## 2023-10-19 DIAGNOSIS — G45.9 TIA (TRANSIENT ISCHEMIC ATTACK): Primary | ICD-10-CM

## 2023-10-19 NOTE — TELEPHONE ENCOUNTER
TIA Clinic  ED 10/18/23  Discharged [id number]      Phone call placed to pt, advised of appointment. Patient scheduled for TIA clinic on 10/20/23.

## 2023-10-20 ENCOUNTER — OFFICE VISIT (OUTPATIENT)
Dept: NEUROLOGY | Facility: CLINIC | Age: 68
End: 2023-10-20
Payer: MEDICARE

## 2023-10-20 VITALS — BODY MASS INDEX: 24.38 KG/M2 | WEIGHT: 180 LBS | HEIGHT: 72 IN

## 2023-10-20 DIAGNOSIS — G45.9 TIA (TRANSIENT ISCHEMIC ATTACK): Primary | ICD-10-CM

## 2023-10-20 DIAGNOSIS — I63.511 CEREBROVASCULAR ACCIDENT (CVA) DUE TO OCCLUSION OF RIGHT MIDDLE CEREBRAL ARTERY (HCC): ICD-10-CM

## 2023-10-20 PROCEDURE — 99205 OFFICE O/P NEW HI 60 MIN: CPT | Performed by: OTHER

## 2023-10-20 RX ORDER — ASPIRIN 81 MG/1
81 TABLET ORAL DAILY
COMMUNITY

## 2023-10-20 RX ORDER — CLOPIDOGREL BISULFATE 75 MG/1
75 TABLET ORAL DAILY
Qty: 21 TABLET | Refills: 0 | Status: SHIPPED | OUTPATIENT
Start: 2023-10-20

## 2023-10-20 RX ORDER — ASPIRIN 81 MG/1
81 TABLET ORAL DAILY
Qty: 90 TABLET | Refills: 3 | Status: SHIPPED | OUTPATIENT
Start: 2023-10-20

## 2023-10-20 NOTE — PROGRESS NOTES
Neurology Initial Visit     Referred By: Dr. Badillo ref. provider found    Chief Complaint: Patient presents with:  TIA: TIA f/u from 10/18/23 - The pt states that he was playing golf and all of sudden his L hand started to tingle with numbness, L arm weakness/ tired heavy feeling, unable to stand up, and speech was slurred for 30 sec-1 minute. States that the slurring resolved, and the only issue he had was the arm weakness. Pt was started on asa 81mg      HPI:     Fidel Velez is a 79year old male, who presents for possible stroke/TIA in October 2023. Patient was playing golf when he started developing left hand tingling, arm and heavy feeling, unable to stand very well, speech was slurring for a couple of minutes. She went to the emergency room. MRI of the brain was done, patient was sent to our TIA clinic and was started on aspirin. Patient was a former smoker, he quit after 45 years of smoking in February 2023. Apparently not very well controlled hypertension and hyperlipidemia.   .     Past Medical History:   Diagnosis Date    Actinic keratosis     Basal cell carcinoma     BCC (basal cell carcinoma) 06/2020    Right central chest    BCC (basal cell carcinoma) 2021    right lateral cheek    Chicken pox     Clear cell tumor 2019    left anterior pinna    High blood pressure     Measles     Mumps     Pancreatitis 2010    Physical exam 2004    Last, done by Dr. Godfrey Em adenoma [facility])     tsh    Pituitary tumor 12/2001    Sinus disorder     sinus problems    Skin cancer 2-1-16    BCC L ant. hairline       Past Surgical History:   Procedure Laterality Date    CHOLECYSTECTOMY      COLONOSCOPY      COLONOSCOPY      COLONOSCOPY N/A 10/20/2020    Procedure: COLONOSCOPY;  Surgeon: Violeta Lopez MD;  Location: [address] [place] [address] ENDOSCOPY    OTHER SURGICAL HISTORY  12/1/2001    pituitary tumor removed       Social history:    Smoking status:   Every Day   0.50 Packs/day      Types:   Cigarettes      Smokeless tobacco:   Never      Comment:   1 unit per day for 30 years          Alcohol use   Yes   4.0 standard drinks of alcohol/week   4 Glasses of wine per week      Comment:   wine occasdionally          Drug use:         Types:   Cannabis       Family History   Problem Relation Age of Onset    Skin cancer Father     Heart Disorder Father     Colon Cancer Mother     Cancer Mother         liver/lung    Diabetes Sister     Heart Disorder Sister     Skin cancer Sister     Skin cancer Brother          Current Outpatient Medications:     aspirin 81 MG Oral Tab EC, Take 1 tablet (81 mg total) by mouth daily. , Disp: , Rfl:     aspirin (ASPIRIN 81) 81 MG Oral Tab EC, Take 1 tablet (81 mg total) by mouth daily. , Disp: 90 tablet, Rfl: 3    clopidogrel (PLAVIX) 75 MG Oral Tab, Take 1 tablet (75 mg total) by mouth daily. , Disp: 21 tablet, Rfl: 0    tamsulosin 0.4 MG Oral Cap, Take 30 minutes after a meal at the same time every day, Disp: , Rfl:     rosuvastatin 20 MG Oral Tab, Take 1 tablet (20 mg total) by mouth daily. , Disp: , Rfl:     Tretinoin 0.1 % External Cream, Apply 1 Application topically nightly. Use small amount to affected areas, Disp: 20 g, Rfl: 3    amLODIPine Besylate 5 MG Oral Tab, TAKE 1 TABLET BY MOUTH EVERY DAY, Disp: , Rfl:     No Known Allergies    ROS:   As in HPI, the rest of the 14 system review was done and was negative      Physical Exam:   10/20/23  0902   Weight: 180 lb (81.6 kg)   Height: 72\"       General: No apparent distress, well nourished, well groomed. Head- Normocephalic, atraumatic  Eyes- No redness or swelling  ENT- Hearing intake, normal glutition  Neck- No masses or adenopathy  Cv: pulses were palpable and normal, no cyanosis or edema     Neurological:     Mental Status- Alert and oriented x3.   Normal attention span and concentration  Thought process intact  Memory intact- recent and remote  Mood intact  Fund of knowledge appropriate for education and age    Language intact including: comprehension, naming, repetition, vocabulary    Cranial Nerves:  II.- Visual fields full to confrontation    III, IV, VI- EOM intact, URBANO  V. Facial sensation intact  VII. Face symmetric, no facial weakness  VIII. Hearing intact to whisper. IX. Pallet elevates symmetrically. XI. Shoulder shrug is intact  XII. Tongue is midline    Motor Exam:  Muscle tone normal  No atrophy or fasciculations  Strength- upper extremities 5/5 proximally and distally                  - lower  extremities 5/5 proximally and distally    Sensory Exam:  Light touch sensation- intact in all 4 extremities    Coordination:  Finger to nose intact  Rapid alternating movements intact    Gait:  Normal posture  Normal physiologic      Labs:    Lab Results   Component Value Date    TSH 1.47 04/06/2018     Lab Results   Component Value Date    HDL 51 04/06/2018     (H) 04/06/2018    TRIG 101 04/06/2018     Lab Results   Component Value Date    HGB 14.9 10/18/2023    HCT 43.6 10/18/2023    MCV 90.5 10/18/2023    WBC 6.9 10/18/2023    .0 10/18/2023      Lab Results   Component Value Date    BUN 15 10/18/2023    CREAT 0.9 12/29/2015    CA 9.2 10/18/2023    ALT 22 04/06/2018    AST 27 04/06/2018    ALKPHOS 55 06/09/2015    ALB 4.3 04/06/2018     10/18/2023    K 4.1 10/18/2023     10/18/2023    CO2 30.0 10/18/2023      I have reviewed labs. Imaging Studies:  I have independently reviewed imaging. MRI of the brain was independent reviewed, possible small lacunar stroke, acute in the right hemisphere noted. Assessment   1. TIA (transient ischemic attack)  Patient with a likely stroke based on MRI findings. We had long discussion about expectations, prognosis and modification of risk factors. Further work-up will be initiated. Patient also will be continued with aspirin indefinitely but also for the next 21 days he will be on Plavix as well.       STROKE RISK FACTORS:   *Hypertension - Target blood pressure <140/90, <130/85 for high risk, normal 120/80  *Physical inactivity - target exercise at least 3 times per week  *Obesity - target ideal body weight and girth <35\" for women, <40\" for men  *Diabetes - Target <6.5-7%   *Smoking - Target smoking cessation  *Hyperlipidemia - Target total cholesterol < 200, Target LDL <100, < 70 for high risk, Target HDL >45 for men, >55 for women, Target triglycerides <150        - CARD ECHO 2D W DOPPLER/BUBBLES (CPT=93306); Future  - CARD MONITOR HOLTER 48 HOUR (CPT=93225); Future  - aspirin (ASPIRIN 81) 81 MG Oral Tab EC; Take 1 tablet (81 mg total) by mouth daily. Dispense: 90 tablet; Refill: 3  - Lipid Panel; Future  - CTA BRAIN + CTA CAROTIDS (UIM=30669/75981); Future    2. Cerebrovascular accident (CVA) due to occlusion of right middle cerebral artery [facility])             Education and counseling provided to patient. Instructed patient to call my office or seek medical attention immediately if symptoms worsen. Patient verbalized understanding of information given. All questions were answered. All side effects of drugs were discussed. Return to clinic in: Return if symptoms worsen or fail to improve.     Steve Grullon MD

## 2023-10-21 ENCOUNTER — LAB ENCOUNTER (OUTPATIENT)
Dept: LAB | Facility: HOSPITAL | Age: 68
End: 2023-10-21
Attending: Other

## 2023-10-21 DIAGNOSIS — G45.9 TIA (TRANSIENT ISCHEMIC ATTACK): ICD-10-CM

## 2023-10-21 LAB
CHOLEST SERPL-MCNC: 133 MG/DL (ref ?–200)
FASTING PATIENT LIPID ANSWER: YES
HDLC SERPL-MCNC: 50 MG/DL (ref 40–59)
LDLC SERPL CALC-MCNC: 66 MG/DL (ref ?–100)
NONHDLC SERPL-MCNC: 83 MG/DL (ref ?–130)
TRIGL SERPL-MCNC: 88 MG/DL (ref 30–149)
VLDLC SERPL CALC-MCNC: 13 MG/DL (ref 0–30)

## 2023-10-21 PROCEDURE — 80061 LIPID PANEL: CPT

## 2023-10-21 PROCEDURE — 36415 COLL VENOUS BLD VENIPUNCTURE: CPT

## 2023-10-25 ENCOUNTER — TELEPHONE (OUTPATIENT)
Dept: NEUROLOGY | Facility: CLINIC | Age: 68
End: 2023-10-25

## 2023-10-25 NOTE — TELEPHONE ENCOUNTER
----- Message from Rian Way MD sent at 10/23/2023  8:05 AM CDT -----  Please let the patient know that results of these particular lab tests so far were normal.  Cholesterol numbers have improved significantly.   Good job    Thank you

## 2023-10-30 ENCOUNTER — HOSPITAL ENCOUNTER (OUTPATIENT)
Dept: CV DIAGNOSTICS | Facility: HOSPITAL | Age: 68
Discharge: HOME | End: 2023-10-30
Attending: Other
Payer: MEDICARE

## 2023-10-30 DIAGNOSIS — G45.9 TIA (TRANSIENT ISCHEMIC ATTACK): ICD-10-CM

## 2023-10-30 PROCEDURE — 93225 XTRNL ECG REC<48 HRS REC: CPT | Performed by: OTHER

## 2023-10-30 PROCEDURE — 93227 XTRNL ECG REC<48 HR R&I: CPT | Performed by: OTHER

## 2023-11-06 ENCOUNTER — TELEPHONE (OUTPATIENT)
Dept: NEUROLOGY | Facility: CLINIC | Age: 68
End: 2023-11-06

## 2023-11-06 NOTE — TELEPHONE ENCOUNTER
Please advise. Reviewed and electronically signed by:  [facility], [address] [place] [address], Critical access hospital

## 2023-11-06 NOTE — TELEPHONE ENCOUNTER
----- Message from Shade Cason MD sent at 11/6/2023  9:38 AM CST -----  Please let the patient know that cardiac monitor didn't show signs of the a. Fib that could have contributed to the TIA/stroke symptoms.

## 2023-11-07 ENCOUNTER — HOSPITAL ENCOUNTER (OUTPATIENT)
Dept: CT IMAGING | Facility: HOSPITAL | Age: 68
Discharge: HOME | End: 2023-11-07
Attending: Other
Payer: MEDICARE

## 2023-11-07 DIAGNOSIS — G45.9 TIA (TRANSIENT ISCHEMIC ATTACK): ICD-10-CM

## 2023-11-07 PROCEDURE — 70496 CT ANGIOGRAPHY HEAD: CPT | Performed by: OTHER

## 2023-11-07 PROCEDURE — 70498 CT ANGIOGRAPHY NECK: CPT | Performed by: OTHER

## 2023-11-07 RX ORDER — IOHEXOL 350 MG/ML
70 INJECTION, SOLUTION INTRAVENOUS
Status: COMPLETED | OUTPATIENT
Start: 2023-11-07 | End: 2023-11-07

## 2023-11-07 RX ADMIN — IOHEXOL 70 ML: 350 INJECTION, SOLUTION INTRAVENOUS at 13:26:00

## 2023-11-08 ENCOUNTER — TELEPHONE (OUTPATIENT)
Dept: NEUROLOGY | Facility: CLINIC | Age: 68
End: 2023-11-08

## 2023-11-08 NOTE — TELEPHONE ENCOUNTER
----- Message from Maranda Nelson MD sent at 11/8/2023  6:55 AM CST -----  Please let patient know that CTA brain didn't show significant abnormalities.

## 2023-11-28 ENCOUNTER — HOSPITAL ENCOUNTER (OUTPATIENT)
Dept: CV DIAGNOSTICS | Facility: HOSPITAL | Age: 68
Discharge: HOME | End: 2023-11-28
Attending: Other
Payer: MEDICARE

## 2023-11-28 DIAGNOSIS — G45.9 TIA (TRANSIENT ISCHEMIC ATTACK): ICD-10-CM

## 2023-11-28 PROCEDURE — 93306 TTE W/DOPPLER COMPLETE: CPT | Performed by: OTHER

## 2023-11-29 ENCOUNTER — TELEPHONE (OUTPATIENT)
Dept: NEUROLOGY | Facility: CLINIC | Age: 68
End: 2023-11-29

## 2023-11-29 NOTE — PROGRESS NOTES
Please let the patient know that echocardiogram didn't show signs of the a. Fib or any holes that could have contributed to the TIA/stroke symptoms.
160.02

## 2024-02-08 ENCOUNTER — LAB ENCOUNTER (OUTPATIENT)
Dept: LAB | Facility: HOSPITAL | Age: 69
End: 2024-02-08
Attending: STUDENT IN AN ORGANIZED HEALTH CARE EDUCATION/TRAINING PROGRAM
Payer: MEDICARE

## 2024-02-08 DIAGNOSIS — E78.00 PURE HYPERCHOLESTEROLEMIA: Primary | ICD-10-CM

## 2024-02-08 DIAGNOSIS — R35.1 BENIGN PROSTATIC HYPERPLASIA WITH NOCTURIA: ICD-10-CM

## 2024-02-08 DIAGNOSIS — N40.1 BENIGN PROSTATIC HYPERPLASIA WITH NOCTURIA: ICD-10-CM

## 2024-02-08 DIAGNOSIS — I10 ESSENTIAL HYPERTENSION, MALIGNANT: ICD-10-CM

## 2024-02-08 DIAGNOSIS — Z12.5 SPECIAL SCREENING FOR MALIGNANT NEOPLASM OF PROSTATE: ICD-10-CM

## 2024-02-08 DIAGNOSIS — Z12.6 ENCOUNTER FOR SCREENING FOR MALIGNANT NEOPLASM OF BLADDER: ICD-10-CM

## 2024-02-08 LAB
ALBUMIN SERPL-MCNC: 4.5 G/DL (ref 3.2–4.8)
ALBUMIN/GLOB SERPL: 1.6 {RATIO} (ref 1–2)
ALP LIVER SERPL-CCNC: 67 U/L
ALT SERPL-CCNC: 25 U/L
ANION GAP SERPL CALC-SCNC: 7 MMOL/L (ref 0–18)
AST SERPL-CCNC: 25 U/L (ref ?–34)
BASOPHILS # BLD AUTO: 0.03 X10(3) UL (ref 0–0.2)
BASOPHILS NFR BLD AUTO: 0.5 %
BILIRUB SERPL-MCNC: 0.6 MG/DL (ref 0.2–1.1)
BUN BLD-MCNC: 13 MG/DL (ref 9–23)
BUN/CREAT SERPL: 15.1 (ref 10–20)
CALCIUM BLD-MCNC: 9.5 MG/DL (ref 8.7–10.4)
CHLORIDE SERPL-SCNC: 106 MMOL/L (ref 98–112)
CHOLEST SERPL-MCNC: 123 MG/DL (ref ?–200)
CO2 SERPL-SCNC: 29 MMOL/L (ref 21–32)
COMPLEXED PSA SERPL-MCNC: 0.62 NG/ML (ref ?–4)
CREAT BLD-MCNC: 0.86 MG/DL
DEPRECATED RDW RBC AUTO: 40.1 FL (ref 35.1–46.3)
EGFRCR SERPLBLD CKD-EPI 2021: 95 ML/MIN/1.73M2 (ref 60–?)
EOSINOPHIL # BLD AUTO: 0.32 X10(3) UL (ref 0–0.7)
EOSINOPHIL NFR BLD AUTO: 5.7 %
ERYTHROCYTE [DISTWIDTH] IN BLOOD BY AUTOMATED COUNT: 12.4 % (ref 11–15)
FASTING PATIENT LIPID ANSWER: YES
FASTING STATUS PATIENT QL REPORTED: YES
GLOBULIN PLAS-MCNC: 2.9 G/DL (ref 2.8–4.4)
GLUCOSE BLD-MCNC: 84 MG/DL (ref 70–99)
HCT VFR BLD AUTO: 49 %
HDLC SERPL-MCNC: 50 MG/DL (ref 40–59)
HGB BLD-MCNC: 17 G/DL
IMM GRANULOCYTES # BLD AUTO: 0.02 X10(3) UL (ref 0–1)
IMM GRANULOCYTES NFR BLD: 0.4 %
LDLC SERPL CALC-MCNC: 57 MG/DL (ref ?–100)
LYMPHOCYTES # BLD AUTO: 1.51 X10(3) UL (ref 1–4)
LYMPHOCYTES NFR BLD AUTO: 26.8 %
MCH RBC QN AUTO: 30.7 PG (ref 26–34)
MCHC RBC AUTO-ENTMCNC: 34.7 G/DL (ref 31–37)
MCV RBC AUTO: 88.4 FL
MONOCYTES # BLD AUTO: 0.33 X10(3) UL (ref 0.1–1)
MONOCYTES NFR BLD AUTO: 5.9 %
NEUTROPHILS # BLD AUTO: 3.43 X10 (3) UL (ref 1.5–7.7)
NEUTROPHILS # BLD AUTO: 3.43 X10(3) UL (ref 1.5–7.7)
NEUTROPHILS NFR BLD AUTO: 60.7 %
NONHDLC SERPL-MCNC: 73 MG/DL (ref ?–130)
OSMOLALITY SERPL CALC.SUM OF ELEC: 293 MOSM/KG (ref 275–295)
PLATELET # BLD AUTO: 213 10(3)UL (ref 150–450)
POTASSIUM SERPL-SCNC: 4.7 MMOL/L (ref 3.5–5.1)
PROT SERPL-MCNC: 7.4 G/DL (ref 5.7–8.2)
RBC # BLD AUTO: 5.54 X10(6)UL
SODIUM SERPL-SCNC: 142 MMOL/L (ref 136–145)
TRIGL SERPL-MCNC: 84 MG/DL (ref 30–149)
VLDLC SERPL CALC-MCNC: 12 MG/DL (ref 0–30)
WBC # BLD AUTO: 5.6 X10(3) UL (ref 4–11)

## 2024-02-08 PROCEDURE — 80061 LIPID PANEL: CPT

## 2024-02-08 PROCEDURE — 85025 COMPLETE CBC W/AUTO DIFF WBC: CPT

## 2024-02-08 PROCEDURE — 80053 COMPREHEN METABOLIC PANEL: CPT

## 2024-02-08 PROCEDURE — 36415 COLL VENOUS BLD VENIPUNCTURE: CPT

## 2024-02-26 ENCOUNTER — OFFICE VISIT (OUTPATIENT)
Dept: DERMATOLOGY CLINIC | Facility: CLINIC | Age: 69
End: 2024-02-26
Payer: MEDICARE

## 2024-02-26 DIAGNOSIS — Z08 ENCOUNTER FOR FOLLOW-UP SURVEILLANCE OF SKIN CANCER: ICD-10-CM

## 2024-02-26 DIAGNOSIS — D23.9 BENIGN NEOPLASM OF SKIN, UNSPECIFIED LOCATION: ICD-10-CM

## 2024-02-26 DIAGNOSIS — Z85.828 ENCOUNTER FOR FOLLOW-UP SURVEILLANCE OF SKIN CANCER: ICD-10-CM

## 2024-02-26 DIAGNOSIS — L82.1 SEBORRHEIC KERATOSES: ICD-10-CM

## 2024-02-26 DIAGNOSIS — D22.9 MULTIPLE NEVI: ICD-10-CM

## 2024-02-26 DIAGNOSIS — L81.4 LENTIGO: ICD-10-CM

## 2024-02-26 DIAGNOSIS — Z85.828 HISTORY OF NONMELANOMA SKIN CANCER: ICD-10-CM

## 2024-02-26 DIAGNOSIS — L57.0 AK (ACTINIC KERATOSIS): Primary | ICD-10-CM

## 2024-02-26 PROCEDURE — 17003 DESTRUCT PREMALG LES 2-14: CPT | Performed by: DERMATOLOGY

## 2024-02-26 PROCEDURE — 99213 OFFICE O/P EST LOW 20 MIN: CPT | Performed by: DERMATOLOGY

## 2024-02-26 PROCEDURE — 17000 DESTRUCT PREMALG LESION: CPT | Performed by: DERMATOLOGY

## 2024-02-26 RX ORDER — VALACYCLOVIR HYDROCHLORIDE 500 MG/1
1 TABLET, FILM COATED ORAL EVERY 12 HOURS
COMMUNITY
Start: 2023-10-26

## 2024-02-26 RX ORDER — ZOLPIDEM TARTRATE 5 MG/1
5 TABLET ORAL NIGHTLY PRN
COMMUNITY
Start: 2024-01-12

## 2024-02-29 ENCOUNTER — PATIENT MESSAGE (OUTPATIENT)
Dept: DERMATOLOGY CLINIC | Facility: CLINIC | Age: 69
End: 2024-02-29

## 2024-03-01 NOTE — TELEPHONE ENCOUNTER
From: Aurelio Dwyer  To: Celina Prescott  Sent: 2/29/2024 3:57 PM CST  Subject: Fluorouracil    My Fluorouracil cream is from 12/2020. Is it still OK to use?     Also, will my red spots keep getting worse for the six weeks I use the cream and heal only after I stop applying it or will they start to improve after day 4 weeks and be gone at 6 weeks?

## 2024-03-02 RX ORDER — FLUOROURACIL 50 MG/G
CREAM TOPICAL
Qty: 40 G | Refills: 0 | Status: SHIPPED | OUTPATIENT
Start: 2024-03-02

## 2024-03-04 NOTE — TELEPHONE ENCOUNTER
LOV 2/26/24 - Fax from [facility] - Fluorouracil 5% cream not covered.  Alternative requested.  Please advise.  Thank you.

## 2024-03-10 NOTE — PROGRESS NOTES
Aurelio Dwyer is a 68 year old male.  HPI:     CC:    Chief Complaint   Patient presents with    Full Skin Exam     LOV . Pt has hx of Aks and BCC, father has hx of Melanoma. Pt present for full body exam. Pt present with a red spot on both ears, has hx of BCC.          Allergies:  Patient has no known allergies.    HISTORY:    Past Medical History:   Diagnosis Date    Actinic keratosis     Basal cell carcinoma     BCC (basal cell carcinoma) 2020    Right central chest    BCC (basal cell carcinoma)     right lateral cheek    Chicken pox     Clear cell tumor     left anterior pinna    Esophageal reflux     High blood pressure     Hyperlipidemia     Measles     Mumps     Pancreatitis (HCC)     Physical exam     Last, done by Dr. Sherwood    Pituitary adenoma (HCC)     tsh    Pituitary tumor 2001    Sinus disorder     sinus problems    Skin cancer 2016    BCC L ant. hairline      Past Surgical History:   Procedure Laterality Date    CHOLECYSTECTOMY      COLONOSCOPY      COLONOSCOPY      COLONOSCOPY N/A 10/20/2020    Procedure: COLONOSCOPY;  Surgeon: Stephon Elliott MD;  Location: [facility] ENDOSCOPY    OTHER SURGICAL HISTORY  2001    pituitary tumor removed      Family History   Problem Relation Age of Onset    Skin cancer Father     Heart Disorder Father     Colon Cancer Mother     Cancer Mother         past in     Diabetes Sister     Heart Disorder Sister     Skin cancer Sister     Skin cancer Brother       Social History     Socioeconomic History    Marital status:    Tobacco Use    Smoking status: Former     Packs/day: 0.00     Years: 1.00     Additional pack years: 0.00     Total pack years: 0.00     Types: Cigarettes     Quit date: 10/18/2023     Years since quittin.3    Smokeless tobacco: Never    Tobacco comments:     1 unit per day for 30 years   Vaping Use    Vaping Use: Never used   Substance and Sexual Activity    Alcohol use: Yes     Alcohol/week:  4.0 standard drinks of alcohol     Types: 4 Glasses of wine per week     Comment: wine occasdionally    Drug use: Yes     Types: Cannabis    Sexual activity: Yes   Other Topics Concern    Grew up on a farm No    History of tanning Yes    Outdoor occupation No    Pt has a pacemaker No    Pt has a defibrillator No    Reaction to local anesthetic No    Caffeine Concern Yes     Comment: coffee-2 cups/day        Current Outpatient Medications   Medication Sig Dispense Refill    zolpidem 5 MG Oral Tab Take 1 tablet (5 mg total) by mouth nightly as needed for Sleep.      valACYclovir 500 MG Oral Tab Take 1 tablet (500 mg total) by mouth Q12H.      aspirin (ASPIRIN 81) 81 MG Oral Tab EC Take 1 tablet (81 mg total) by mouth daily. 90 tablet 3    clopidogrel (PLAVIX) 75 MG Oral Tab Take 1 tablet (75 mg total) by mouth daily. 21 tablet 0    rosuvastatin 20 MG Oral Tab Take 1 tablet (20 mg total) by mouth daily.      amLODIPine Besylate 5 MG Oral Tab TAKE 1 TABLET BY MOUTH EVERY DAY      fluorouracil 5 % External Cream Apply twice weekly for 6 weeks to affected area. 40 g 0    aspirin 81 MG Oral Tab EC Take 1 tablet (81 mg total) by mouth daily.      tamsulosin 0.4 MG Oral Cap Take 30 minutes after a meal at the same time every day      Tretinoin 0.1 % External Cream Apply 1 Application topically nightly. Use small amount to affected areas 20 g 3     Allergies:   No Known Allergies    Past Medical History:   Diagnosis Date    Actinic keratosis     Basal cell carcinoma     BCC (basal cell carcinoma) 06/2020    Right central chest    BCC (basal cell carcinoma) 2021    right lateral cheek    Chicken pox     Clear cell tumor 2019    left anterior pinna    Esophageal reflux 2020    High blood pressure     Hyperlipidemia 2021    Measles     Mumps     Pancreatitis (HCC) 2010    Physical exam 2004    Last, done by Dr. Sherwood    Pituitary adenoma (HCC)     tsh    Pituitary tumor 12/2001    Sinus disorder     sinus problems    Skin  cancer 2016    BCC L ant. hairline     Past Surgical History:   Procedure Laterality Date    CHOLECYSTECTOMY      COLONOSCOPY      COLONOSCOPY      COLONOSCOPY N/A 10/20/2020    Procedure: COLONOSCOPY;  Surgeon: Stephon Elliott MD;  Location: [facility] ENDOSCOPY    OTHER SURGICAL HISTORY  2001    pituitary tumor removed     Social History     Socioeconomic History    Marital status:      Spouse name: Not on file    Number of children: Not on file    Years of education: Not on file    Highest education level: Not on file   Occupational History    Not on file   Tobacco Use    Smoking status: Former     Packs/day: 0.00     Years: 1.00     Additional pack years: 0.00     Total pack years: 0.00     Types: Cigarettes     Quit date: 10/18/2023     Years since quittin.3    Smokeless tobacco: Never    Tobacco comments:     1 unit per day for 30 years   Vaping Use    Vaping Use: Never used   Substance and Sexual Activity    Alcohol use: Yes     Alcohol/week: 4.0 standard drinks of alcohol     Types: 4 Glasses of wine per week     Comment: wine occasdionally    Drug use: Yes     Types: Cannabis    Sexual activity: Yes   Other Topics Concern    Grew up on a farm No    History of tanning Yes    Outdoor occupation No    Pt has a pacemaker No    Pt has a defibrillator No    Reaction to local anesthetic No     Service Not Asked    Blood Transfusions Not Asked    Caffeine Concern Yes     Comment: coffee-2 cups/day    Occupational Exposure Not Asked    Hobby Hazards Not Asked    Sleep Concern Not Asked    Stress Concern Not Asked    Weight Concern Not Asked    Special Diet Not Asked    Back Care Not Asked    Exercise Not Asked    Bike Helmet Not Asked    Seat Belt Not Asked    Self-Exams Not Asked   Social History Narrative    Not on file     Social Determinants of Health     Financial Resource Strain: Not on file   Food Insecurity: Not on file   Transportation Needs: Not on file   Physical Activity: Not  on file   Stress: Not on file   Social Connections: Not on file   Housing Stability: Not on file     Family History   Problem Relation Age of Onset    Skin cancer Father     Heart Disorder Father     Colon Cancer Mother     Cancer Mother         past in 1982    Diabetes Sister     Heart Disorder Sister     Skin cancer Sister     Skin cancer Brother        There were no vitals filed for this visit.    HPI:  Chief Complaint   Patient presents with    Full Skin Exam     LOV 09/23. Pt has hx of Aks and BCC, father has hx of Melanoma. Pt present for full body exam. Pt present with a red spot on both ears, has hx of BCC.      Follow-up history AK's, BCC clinically atypical nevi, multiple benign keratoses.  Family history of skin cancer.  Has been fishing over the summer.  Several fishhook injuries which she is recuperating from patient outdoors a fair amount has been careful to wear hat less consistent with sunscreen.  Uses tretinoin off-and-on  Past notes/ records and appropriate/relevant lab results including pathology and past body maps reviewed. Updated and new information noted in current visit.      Has used the Zyclara and Efudex.  Previously    Careful sun protection while outdoors fishing not coughing as much this year.     history BCC, clear cell tumor from left ear status post Mohs surgery.  Right central chest BCC ENC at visit 6/26/2020  Has been very careful use sunscreen.  Has not been out golfing yet very much due to the weather  Patient presents with concerns above.    Patient has been in their usual state of health.  History, medications, allergies reviewed as noted.      ROS:  Denies any other systemic complaints.  No new or changeing lesions other than noted above. No fevers, chills, night sweats, unusual sun sensitivity.  No other skin complaints.        History, medications, allergies reviewed as noted.       Physical Examination:     Well-developed well-nourished patient alert oriented in no acute  distress.  Exam performed, including scalp, head, neck, face,nails, hair, external eyes, including conjunctival mucosa, eyelids, lips external ears , arms, digits,palms.     Multiple light to medium brown, well marginated, uniformly pigmented, macules and papules 6 mm and less scattered on exam. pigmented lesions examined with dermoscopy benign-appearing patterns.     Waxy tannish keratotic papules scattered, cherry-red vascular papules scattered.    See map today's date for lesions noted .  See assessment and plan below for specific lesions.    Otherwise remarkable for lesions as noted on map.   Assessment / plan:    No orders of the defined types were placed in this encounter.      Meds & Refills for this Visit:  Requested Prescriptions      No prescriptions requested or ordered in this encounter         Encounter Diagnoses   Name Primary?    AK (actinic keratosis) Yes    Multiple nevi     Benign neoplasm of skin, unspecified location     Lentigo     Seborrheic keratoses     History of nonmelanoma skin cancer     Encounter for follow-up surveillance of skin cancer          Right anterior neck verrucoid keratosis    Erythematous scaling keratotic papules noted at sites noted on map  Actinic Keratoses.  Precancerous nature discussed. Sun protection, sunscreen/ blocks encouraged Lesions treated with cryo- .  Biopsy if not resolved.    Left temple particular cheek right lateral helical rim right foreheadx4    Actinic keratoses.  Precancerous nature discussed.  Treatment options reviewed at length we will proceed with topical therapy with   fluorouracil   twice weekly for   6   week course  of actual medication use and may alternate weeks if necessary.  Increased redness scaling crusting irritation pain tenderness potential discussed.  Anticipate one month for resolution of symptoms.  Plan recheck in one month after completion of treatment course.  Consider alternative treatment biopsy if not resolved.  Daily for 2  weeks to area on ear, twice weekly for 6 weeks to forehead periorbital area anterior scalp  Tretinoin maintenance twice weekly may alternate with the fluorouracil      BCC s/p Mohs right lateral cheek.  Healing well.    History of AK's overall is doing fairly well.  Background actinic damage changes with splotchy dyschromia some erythematous patches and tan-brown patches.  Discussed sun protection.  Will use vitamin C serum twice daily, tretinoin nightly increasing nightly as tolerated, will use tretinoin discussed use on forehead temples, may use on the ears may use this to left ear scaling plaque more consistently.  This may help with the background AK's.  Will use instead of fluorouracil he has not started this.  Very concerned regarding irritation crusting redness.    Waxy tan papules at left temporal scalp seborrheic keratoses treat with cryo.  Smaller AK's over the forearms right in particular.  Overall doing well  At risk for additional AK's.  Still recommend field treatment of recurrence and image changes    For background of sun damage early AK's tretinoin 0.1% cream nightly as tolerated along with sun protection and consider repeat course of fluorouracil.  Currently no new suspicious lesions.  No atypical appearing pigmented lesions  Careful sun protection.  Patient very concerned guarding possible irritation in areas of tretinoin.  Use cautiously sparingly    Areas of sebaceous hyperplasia, waxy tan papules left lateral cheek reassurance consider trial of cryo    Other benign keratoses continue careful monitoring with history of AK's over the temples and forehead no other suspicious lesions on todays exam    BCC right central chest post electrodesiccation curettage 6/26/2020  Actinic keratoses stable extensive benign keratoses.  No recurrence of prior skin cancer      Patient with history of skin cancer.  No evidence of recurrence.      Lesions of concern tan-brown papules at posterior neck seborrheic  keratoses resolved.  Intradermal nevus posterior neck unchanged    Waxy tan plaques over lateral cheeks, temples temporal scalp reassurance regarding benign keratoses consider cryo.  Multiple benign keratoses including left ear, temples forehead.  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  Observe carefully    Continue careful sun protection.  Multiple other benign keratoses lentigines nevi no new suspicious lesions    Sun damage, extensive keratoses over the face cheeks brow.  Discussed possible Retin-A as chemoprevention.  Reviewed over-the-counter products as well.  So this may help with some dyschromia from cryo as well.  Careful sun protection.  Retin-A application instructions reviewed.  May not be covered patient aware    Extensive lentigines keratoses no other new suspicious lesions    Please refer to map for specific lesions.  See additional diagnoses.  Pros cons of various therapies, risks benefits discussed.Pathophysiology discussed with patient.  Therapeutic options reviewed.  See  Medications in grid.  Instructions reviewed at length.    Benign nevi, seborrheic  keratoses, cherry angiomas:  Reassurance regarding other benign skin lesions.Signs and symptoms of skin cancer, ABCDE's of melanoma discussed with patient. Sunscreen use, sun protection, self exams reviewed.  Followup as noted RTC ---routine checkup    6 mos -one year or p.r.n.      No other new suspicious lesions plan recheck 3 months or as needed.  Patient aware to continue careful sun protection while he is out golfing and fishing over the summer.    The patient indicates understanding of these issues and agrees to the plan.  The patient is asked to return as noted in follow-up/ above.    This note was generated using Dragon voice  recognition software.  Please contact me regarding any confusion resulting from errors in recognition.  Encounter Times   Including precharting, reviewing chart, prior notes obtaining history: 10 minutes, medical exam :10 minutes, notes on body map, plan, counseling 10minutes My total time spent caring for the patient on the day of the encounter: 30 minutes    Note to patient and family: The 21st Century Cures Act makes medical notes like these available to patients. However, be advised this is a medical document. It is intended as [id number] communication and monitoring of a patient's care needs. It is written in medical language and may contain abbreviations or verbiage that are unfamiliar. It may appear blunt or direct. Medical documents are intended to carry relevant information, facts as evident and the clinical opinion of the practitioner.

## 2024-05-23 ENCOUNTER — TELEPHONE (OUTPATIENT)
Facility: CLINIC | Age: 69
End: 2024-05-23

## 2024-05-23 DIAGNOSIS — K86.2 PANCREATIC CYST (HCC): ICD-10-CM

## 2024-05-23 DIAGNOSIS — D49.0 IPMN (INTRADUCTAL PAPILLARY MUCINOUS NEOPLASM): Primary | ICD-10-CM

## 2024-05-23 NOTE — TELEPHONE ENCOUNTER
Patient contacted and number for central scheduling given to schedule his MRI/MRCP.  F/u appointment made for 07/08/2024 at 9 am with Dr. Elliott.  Patient advised to arrive 15 minutes prior to appointment and address given.  Patient voiced understanding.

## 2024-06-26 ENCOUNTER — HOSPITAL ENCOUNTER (OUTPATIENT)
Dept: MRI IMAGING | Facility: HOSPITAL | Age: 69
Discharge: HOME | End: 2024-06-26
Attending: INTERNAL MEDICINE

## 2024-06-26 DIAGNOSIS — D49.0 IPMN (INTRADUCTAL PAPILLARY MUCINOUS NEOPLASM): ICD-10-CM

## 2024-06-26 DIAGNOSIS — K86.2 PANCREATIC CYST (HCC): ICD-10-CM

## 2024-06-26 PROCEDURE — 74183 MRI ABD W/O CNTR FLWD CNTR: CPT | Performed by: INTERNAL MEDICINE

## 2024-06-26 PROCEDURE — A9575 INJ GADOTERATE MEGLUMI 0.1ML: HCPCS | Performed by: INTERNAL MEDICINE

## 2024-06-26 RX ORDER — GADOTERATE MEGLUMINE 376.9 MG/ML
20 INJECTION INTRAVENOUS
Status: COMPLETED | OUTPATIENT
Start: 2024-06-26 | End: 2024-06-26

## 2024-06-26 RX ADMIN — GADOTERATE MEGLUMINE 16 ML: 376.9 INJECTION INTRAVENOUS at 14:41:00

## 2024-06-28 ENCOUNTER — TELEPHONE (OUTPATIENT)
Facility: CLINIC | Age: 69
End: 2024-06-28

## 2024-06-28 NOTE — TELEPHONE ENCOUNTER
Stephon Elliott MD  [facility]  See the following [employer] message sent to the patient:    \"Hi Aurelio,    I left a message on your voicemail.    I hope that you are feeling well.    I have reviewed the most recent MRI with our radiologist and compared this study to your previous MRIs.    We do not feel that the cysts have significantly changed in size.  The current scan is of better quality with clearer pictures.  No worrisome findings are seen.    I would recommend that we continue to follow the cysts.  I would repeat the MRI in 1 year.    If you have any questions please let me know.    I look forward to seeing you in the office on July 8.    Dr. Elliott\"    GI RNs: Please enter MRI/MRCP recall for 1 year.

## 2024-07-08 ENCOUNTER — OFFICE VISIT (OUTPATIENT)
Facility: CLINIC | Age: 69
End: 2024-07-08
Payer: MEDICARE

## 2024-07-08 VITALS
BODY MASS INDEX: 24.08 KG/M2 | HEIGHT: 72 IN | DIASTOLIC BLOOD PRESSURE: 78 MMHG | WEIGHT: 177.81 LBS | SYSTOLIC BLOOD PRESSURE: 126 MMHG | HEART RATE: 66 BPM

## 2024-07-08 DIAGNOSIS — D49.0 IPMN (INTRADUCTAL PAPILLARY MUCINOUS NEOPLASM): Primary | ICD-10-CM

## 2024-07-08 PROCEDURE — 99213 OFFICE O/P EST LOW 20 MIN: CPT | Performed by: INTERNAL MEDICINE

## 2024-07-10 NOTE — PROGRESS NOTES
Subjective:   Patient ID: Aurelio Dwyer is a 68 year old male.    HPI  Aurelio returns in follow-up.  He was last seen on March 27, 2023. Please see previous notes for historical details.     In brief Aurelio has had approximately 7 episodes of documented acute pancreatitis dating back to 2010 in the setting of a 2 cm branch duct therapy IPMN.  He also had coexistent cholelithiasis with repeatedly normal liver chemistries and pancreas divisum.  He had been evaluated previously at multiple academic institutions.  The predominant consensus was that of a Whipple resection.  The patient declined.  He saw Dr. Spears at [place] who advised an initial cholecystectomy which was performed in 2016.  He has had no episodes of pancreatitis since.  The patient had a recent surveillance MRI/MRCP and returns in follow-up.     Current history:  Aurelio has been quite well.  He will be moving near [place], [place] in the next several weeks.  His appetite is good.  His weight is stable.  He has occasional globus and occasional difficulty swallowing a pill if it presents sideways.  No dysphagia to food.  He denies heartburn.  He has had no abdominal pain reminiscent of pancreatitis.  His bowel movements have been somewhat irregular which he attributes to the stress of his upcoming move.  He notes occasional blood on the toilet tissue but no blood in the stool.    Aurelio unfortunately continues to smoke 1-4 cigarettes daily.    Subjective wellbeing is good.  See above        History/Other:   Review of Systems  See above    Wt Readings from Last 6 Encounters:   07/08/24 177 lb 12.8 oz (80.6 kg)   10/20/23 180 lb (81.6 kg)   10/18/23 180 lb (81.6 kg)   03/27/23 181 lb (82.1 kg)   08/20/22 180 lb (81.6 kg)   10/20/20 175 lb (79.4 kg)       Current Outpatient Medications   Medication Sig Dispense Refill    zolpidem 5 MG Oral Tab Take 1 tablet (5 mg total) by mouth nightly as needed for Sleep.      valACYclovir 500 MG Oral Tab Take 1  tablet (500 mg total) by mouth Q12H.      aspirin (ASPIRIN 81) 81 MG Oral Tab EC Take 1 tablet (81 mg total) by mouth daily. 90 tablet 3    rosuvastatin 20 MG Oral Tab Take 1 tablet (20 mg total) by mouth daily.      amLODIPine Besylate 5 MG Oral Tab TAKE 1 TABLET BY MOUTH EVERY DAY      fluorouracil 5 % External Cream Apply twice weekly for 6 weeks to affected area. 40 g 0    aspirin 81 MG Oral Tab EC Take 1 tablet (81 mg total) by mouth daily.      clopidogrel (PLAVIX) 75 MG Oral Tab Take 1 tablet (75 mg total) by mouth daily. 21 tablet 0    tamsulosin 0.4 MG Oral Cap Take 30 minutes after a meal at the same time every day      Tretinoin 0.1 % External Cream Apply 1 Application topically nightly. Use small amount to affected areas 20 g 3     Allergies:No Known Allergies    Objective:   Physical Exam  Vitals and nursing note reviewed.   Constitutional:       General: He is not in acute distress.     Appearance: He is well-developed. He is not ill-appearing, toxic-appearing or diaphoretic.      Comments: Looks great  Thin   HENT:      Mouth/Throat:      Pharynx: No oropharyngeal exudate.   Eyes:      General: No scleral icterus.     Conjunctiva/sclera: Conjunctivae normal.   Neck:      Thyroid: No thyromegaly.   Cardiovascular:      Rate and Rhythm: Normal rate and regular rhythm.      Heart sounds: Normal heart sounds. No murmur heard.  Pulmonary:      Effort: Pulmonary effort is normal. No respiratory distress.      Breath sounds: Normal breath sounds. No wheezing or rales.   Abdominal:      General: Bowel sounds are normal. There is no distension.      Palpations: Abdomen is soft. There is no mass.      Tenderness: There is no abdominal tenderness. There is no guarding or rebound.   Musculoskeletal:      Cervical back: Neck supple.   Lymphadenopathy:      Cervical: No cervical adenopathy.   Neurological:      Mental Status: He is alert and oriented to person, place, and time.   Psychiatric:         Behavior:  Behavior normal.         PROCEDURE:  MRI MRCP (W+WO) (CPT=74183)     COMPARISON:  None.     INDICATIONS:  K86.2 Pancreatic cyst (HCC) D49.0 IPMN (intraductal papillary mucinous neoplasm)     TECHNIQUE:    Multiplanar single shot fast spin echo, chemical shift imaging, breath hold T2 weighted images and 2-D fiesta sequences were acquired. No contrast material was administered. Fluid sensitive imaging with MRCP. Reconstruction was also  performed including 3D multi-projection imaging.  Both projection and source MRCP images are evaluated.     3-D RENDERING:  Three dimensional image processing was completed using a separate workstation under concurrent supervision. Images were archived.     PATIENT STATED HISTORY:(As transcribed by Technologist)  Intraductal papillary mucinous neoplasm surveillance imaging.      CONTRAST USED:  16mL of Dotarem     FINDINGS:  Multiple cysts are noted throughout the liver within index cyst in segment 4A of the liver measuring up to 1.6 cm (series 4, image 13), previously measuring up to 1.3 cm.  An additional index cyst in segment 3 of the liver measures up to 1.0  cm (series 4, image 15), not significantly changed.  Several additional smaller cysts are noted.     There is no intra or extrahepatic biliary ductal dilatation.  Gallbladder is surgically absent.     Multiple cysts are noted in the pancreas with the largest cyst located in the head of the pancreas measuring up to 2.4 x 2.2 cm (series 4, image 19), previously measuring up to 2.1 x 1.6 cm.  An additional cyst in the head of the pancreas measures up to  2.3 x 1.1 cm (series 4, image 21), previously measuring up to 1.8 x 0.7 cm.  The pancreatic duct is within normal limits.     Multiple subcentimeter simple cysts are noted in the kidneys.  The kidneys are otherwise within normal limits.  No hydronephrosis.  A left adrenal mass is noted measuring up to 1.8 x 1.9 cm compatible with a lipid rich adenoma.  The right adrenal gland  is  within normal limits.  The spleen is unremarkable.     The bowel is within normal limits.  No retroperitoneal adenopathy.  No aggressive osseous lesions are identified.                   Impression   CONCLUSION:    1. Cysts in the head of the pancreas may represent IPMN and appear mildly increased in size compared to the prior examination.  One year follow-up is recommended.        LOCATION:  [facility]        Dictated by (CST): Ad Mccarthy MD on 6/26/2024 at 4:05 PM      Finalized by (CST): Ad Mccarthy MD on 6/26/2024 at 4:21 PM       Assessment & Plan:   1. IPMN (intraductal papillary mucinous neoplasm)    Aurelio has a history of recurrent episodes of pancreatitis in the setting of a 2 cm branch duct IPMN.  Resection of the cyst (Whipple) was discussed previously but declined by the patient.  He underwent a cholecystectomy for cholelithiasis and fortunately has not had any recurrent episodes of pancreatitis.  He had a recent MRI/MRCP which revealed stability of the IPMN.  The MRI was reviewed with the radiologist.  I am recommending continued close MR surveillance with a repeat MRI/MRCP in 1 year.  The patient should seek medical attention for any GI symptoms.  He will establish GI care in the [facility].  I will suggest that he see Taco Gant MD who's areas of interest include EUS and pancreatic cysts.  I am available to assist the patient as needed.  We also discussed smoking cessation.        Meds This Visit:  Requested Prescriptions      No prescriptions requested or ordered in this encounter       Imaging & Referrals:  None

## 2024-07-10 NOTE — PATIENT INSTRUCTIONS
1.  Repeat MRI/MRCP in 1 year.  2.  Establish care with gastroenterology in the [place], [place] area.    Aurelio you could consider seeing Taco Gant MD.  His areas of interest include pancreatic cysts.  I do not, however, know him personally.    Contact information:  Taco Gant MD  [address] [place] [address]  [phone]   Physical Therapy Daily Treatment    Visit Count: 12   Plan of Care: 5/1/2018 Through: 6/12/2018 extend thru 7/17/18  Referred by: Bertram Gil MD; Next provider visit (if known/scheduled): 6/15/18  Medical Diagnosis (from order):  Lumbar paraspinal muscle spasm [M62.830]  - Primary   Work Injury Information:   Current Employer: [employer]   Full Duty Work Demands: light (10 - 20 lbs)   Current Work Status: full time occupation: ; off work due to current condition    Case Notes/Attendance:  Contact with Adjustor /     · Adjustor / :  Manpreet Jimenez  · Phone#: [phone]   · Date of Communication: 5/21/18; Results: left voice message informing Manpreet that patient has been attending physical therapy at [facility]  Attendance Concerns:  None at this time    SUBJECTIVE   Went to work today and sat a lot but enjoyed it.  Pain in back is present most of the time except when sleeping  Right lumbar region    Current Pain (0-10 scale): 3/10     Functional Change: did laundry yesterday and did 4 loads.    OBJECTIVE   Current work restrictions:  Lifting no more than 5#  Standing work up to 2 hours at a time    Abbreviation Key:  (-) = no pain (+) = mild pain (++) = moderate pain (+++)= severe pain     Range of Motion (%)  Date  Initial   Current    Lumbar Flexion 75 (+)   75+    Lumbar Extension 25 (++)   25++      Left Right Left Right   Lumbar Lateral Flexion  50 (+) 50 (+) 75 - 75 +   Lumbar Rotation          standard testing positions unless otherwise noted; ranges are reported in active range of motion unless noted AA=active assistive range of motion and P=passive range of motion; * =pain  Only those motions that were assessed are noted.    Palpation:  Mild tenderness to palpation of bilateral gluteals and paraspinals.  Increased restriction in bilateral paraspinals.    Outcome Measures:   Oswestry: OSWESTRY Score Calculated: 38 % (0-20% = minimal  disability; 20-40% = moderate disability; 40-60% = severe disability; 60-80% = crippled; % = bed bound)     Functional Status:   Functional Activity Patient  Abilities  Date: 5/16/18 Job Demands  Client Report   Lift floor to waist 13 15   Lift waist to chest 13 15   Lift waist to overhead 13 15   Two Hand Carry  20   Push/Pull  20   Sustained overhead work     Sustained sitting     Sustained standing  8 hours   Sustained kneeling      Areas blank above not yet assessed due to not medically appropriate.  Will be assessed when appropriate.    Treatment   Manual Therapy:  MFR to right lower abdomen to relieve low back tightness right  Therapeutic Exercise:   Reviewed home exercise program   Stretches-  more able to move into left rotation on right and single knee to chest  pilates ring sitting  Adductor sets  Press downs out to side 2x10 bilateral  Home Program:   Access Code: [email or web address]    Hooklying Single Knee to Chest - 10 reps - 1 sets - 3 hold - 3x daily - 7x weekly  Supine Figure 4 Piriformis Stretch - 10 reps - 1 sets - 3 hold - 3x daily - 7x weekly  Sidelying Open Book Thoracic Lumbar Rotation and Extension - 10 reps - 2 sets - 3 hold - 1x daily - 7x weekly  Quadruped Cat Camel - 10 reps - 2 sets - 3 hold - 1x daily - 7x weekly     ASSESSMENT   Patient feeling looser afte rx and feels that she is gradually improving  Pain after treatment (0-10 scale): not numerically rated - \"better/looser\"  Result of above outlined education: Verbalizes understanding, Demonstrates understanding and Needs reinforcement    Goals:  To be obtained by end of this plan of care:  1. Patient independent with modified and progressed home exercise program.  2. Improve function in sitting/standing for 30 minutes to cook/eat a meal, lifting as required and completing household tasks through:        A. decreased pain/symptoms to 2/10         B. increased active range of motion to within normal limits          C. increased strength to  5/5   3. Patient will be able to bend and lift for activities of daily living and instrumental activities of daily living completion at home and work with minimal pain/difficulty.    PLAN    Continue physical therapy 2x/week for additional 6 weeks- include   Dry Needling - Unlisted physical medicine/rehabilitation service or procedure (48007) in addition to current plan of care.     THERAPY DAILY BILLING   Insurance: [facility] 2. N/A    Evaluation Procedures:  No evaluation codes were used on this date of service    Timed Procedures:  Manual Therapy, 20 minutes  Therapeutic Exercise, 25 minutes    Untimed Procedures:  No untimed codes were used on this date of service    Total Treatment Time: 45 minutes

## 2024-09-19 ENCOUNTER — OFFICE VISIT (OUTPATIENT)
Dept: DERMATOLOGY CLINIC | Facility: CLINIC | Age: 69
End: 2024-09-19

## 2024-09-19 DIAGNOSIS — Z85.828 ENCOUNTER FOR FOLLOW-UP SURVEILLANCE OF SKIN CANCER: ICD-10-CM

## 2024-09-19 DIAGNOSIS — D23.9 BENIGN NEOPLASM OF SKIN, UNSPECIFIED LOCATION: ICD-10-CM

## 2024-09-19 DIAGNOSIS — L82.1 SEBORRHEIC KERATOSES: ICD-10-CM

## 2024-09-19 DIAGNOSIS — L57.0 AK (ACTINIC KERATOSIS): Primary | ICD-10-CM

## 2024-09-19 DIAGNOSIS — L81.4 LENTIGO: ICD-10-CM

## 2024-09-19 DIAGNOSIS — Z08 ENCOUNTER FOR FOLLOW-UP SURVEILLANCE OF SKIN CANCER: ICD-10-CM

## 2024-09-19 DIAGNOSIS — D22.9 MULTIPLE NEVI: ICD-10-CM

## 2024-09-19 PROCEDURE — 17000 DESTRUCT PREMALG LESION: CPT | Performed by: DERMATOLOGY

## 2024-09-19 PROCEDURE — 99213 OFFICE O/P EST LOW 20 MIN: CPT | Performed by: DERMATOLOGY

## 2024-09-19 PROCEDURE — 17003 DESTRUCT PREMALG LES 2-14: CPT | Performed by: DERMATOLOGY

## 2024-09-29 NOTE — PROGRESS NOTES
Aurelio Dwyer is a 69 year old male.  HPI:     CC:    Chief Complaint   Patient presents with    Full Skin Exam     Father hx of melanoma.  Personal hx of Aks and BCC.  LOV 2024.  Pt presents for FBSE.  Lesions of concern to the forehead and bilateral ears.          Allergies:  Patient has no known allergies.    HISTORY:    Past Medical History:    Actinic keratosis    Basal cell carcinoma    BCC (basal cell carcinoma)    Right central chest    BCC (basal cell carcinoma)    right lateral cheek    Chicken pox    Clear cell tumor    left anterior pinna    Esophageal reflux    High blood pressure    Hyperlipidemia    Measles    Mumps    Pancreatitis (HCC)    Physical exam    Last, done by Dr. Sherwood    Pituitary adenoma (HCC)    tsh    Pituitary tumor    Sinus disorder    sinus problems    Skin cancer    BCC L ant. hairline      Past Surgical History:   Procedure Laterality Date    Cholecystectomy      Colonoscopy      Colonoscopy      Colonoscopy N/A 10/20/2020    Procedure: COLONOSCOPY;  Surgeon: Stephon Elliott MD;  Location: [facility] ENDOSCOPY    Other surgical history  2001    pituitary tumor removed      Family History   Problem Relation Age of Onset    Skin cancer Father     Heart Disorder Father     Cancer Father         skin & prostate    Colon Cancer Mother     Cancer Mother         past in     Diabetes Sister     Heart Disorder Sister     Skin cancer Sister     Skin cancer Brother       Social History     Socioeconomic History    Marital status:    Tobacco Use    Smoking status: Some Days     Current packs/day: 0.00     Types: Cigarettes     Last attempt to quit: 10/18/2022     Years since quittin.9    Smokeless tobacco: Never    Tobacco comments:     1 unit per day for 30 years   Vaping Use    Vaping status: Never Used   Substance and Sexual Activity    Alcohol use: Yes     Alcohol/week: 5.0 standard drinks of alcohol     Types: 3 Cans of beer, 2 Standard drinks or equivalent per week      Comment: wine occasdionally    Drug use: Yes     Frequency: 2.0 times per week     Types: Cannabis    Sexual activity: Yes   Other Topics Concern    Grew up on a farm No    History of tanning Yes    Outdoor occupation No    Pt has a pacemaker No    Pt has a defibrillator No    Reaction to local anesthetic No    Caffeine Concern Yes     Comment: coffee-2 cups/day     Social Determinants of Health     Financial Resource Strain: Low Risk  (9/13/2022)    [facility]    Overall Financial Resource Strain (CARDIA)     Difficulty of Paying Living Expenses: Not hard at all   Food Insecurity: No Food Insecurity (9/13/2022)    [facility]    Hunger Vital Sign     Worried About Running Out of Food in the Last Year: Never true     Ran Out of Food in the Last Year: Never true   Transportation Needs: No Transportation Needs (9/13/2022)    [facility]    PRAPARE - Transportation     Lack of Transportation (Medical): No     Lack of Transportation (Non-Medical): No   Housing Stability: Unknown (9/13/2022)    [facility]    Housing Stability Vital Sign     Unable to Pay for Housing in the Last Year: No     Unstable Housing in the Last Year: No        Current Outpatient Medications   Medication Sig Dispense Refill    zolpidem 5 MG Oral Tab Take 1 tablet (5 mg total) by mouth nightly as needed for Sleep.      valACYclovir 500 MG Oral Tab Take 1 tablet (500 mg total) by mouth Q12H.      aspirin (ASPIRIN 81) 81 MG Oral Tab EC Take 1 tablet (81 mg total) by mouth daily. 90 tablet 3    rosuvastatin 20 MG Oral Tab Take 1 tablet (20 mg total) by mouth daily.      amLODIPine Besylate 5 MG Oral Tab TAKE 1 TABLET BY MOUTH EVERY DAY      fluorouracil 5 % External Cream  Apply twice weekly for 6 weeks to affected area. 40 g 0    aspirin 81 MG Oral Tab EC Take 1 tablet (81 mg total) by mouth daily.      clopidogrel (PLAVIX) 75 MG Oral Tab Take 1 tablet (75 mg total) by mouth daily. 21 tablet 0    tamsulosin 0.4 MG Oral Cap Take 30 minutes after a meal at the same time every day      Tretinoin 0.1 % External Cream Apply 1 Application topically nightly. Use small amount to affected areas 20 g 3     Allergies:   No Known Allergies    Past Medical History:    Actinic keratosis    Basal cell carcinoma    BCC (basal cell carcinoma)    Right central chest    BCC (basal cell carcinoma)    right lateral cheek    Chicken pox    Clear cell tumor    left anterior pinna    Esophageal reflux    High blood pressure    Hyperlipidemia    Measles    Mumps    Pancreatitis (HCC)    Physical exam    Last, done by Dr. Sherwood    Pituitary adenoma (HCC)    tsh    Pituitary tumor    Sinus disorder    sinus problems    Skin cancer    BCC L ant. hairline     Past Surgical History:   Procedure Laterality Date    Cholecystectomy      Colonoscopy      Colonoscopy      Colonoscopy N/A 10/20/2020    Procedure: COLONOSCOPY;  Surgeon: Stephon Elliott MD;  Location: [facility] ENDOSCOPY    Other surgical history  2001    pituitary tumor removed     Social History     Socioeconomic History    Marital status:      Spouse name: Not on file    Number of children: Not on file    Years of education: Not on file    Highest education level: Not on file   Occupational History    Not on file   Tobacco Use    Smoking status: Some Days     Current packs/day: 0.00     Types: Cigarettes     Last attempt to quit: 10/18/2022     Years since quittin.9    Smokeless tobacco: Never    Tobacco comments:     1 unit per day for 30 years   Vaping Use    Vaping status: Never Used   Substance and Sexual Activity    Alcohol use: Yes     Alcohol/week: 5.0 standard drinks of alcohol     Types: 3 Cans of beer, 2 Standard drinks or  equivalent per week     Comment: wine occasdionally    Drug use: Yes     Frequency: 2.0 times per week     Types: Cannabis    Sexual activity: Yes   Other Topics Concern    Grew up on a farm No    History of tanning Yes    Outdoor occupation No    Pt has a pacemaker No    Pt has a defibrillator No    Reaction to local anesthetic No     Service Not Asked    Blood Transfusions Not Asked    Caffeine Concern Yes     Comment: coffee-2 cups/day    Occupational Exposure Not Asked    Hobby Hazards Not Asked    Sleep Concern Not Asked    Stress Concern Not Asked    Weight Concern Not Asked    Special Diet Not Asked    Back Care Not Asked    Exercise Not Asked    Bike Helmet Not Asked    Seat Belt Not Asked    Self-Exams Not Asked   Social History Narrative    Not on file     Social Determinants of Health     Financial Resource Strain: Low Risk  (9/13/2022)    [facility]    Overall Financial Resource Strain (CARDIA)     Difficulty of Paying Living Expenses: Not hard at all   Food Insecurity: No Food Insecurity (9/13/2022)    [facility] from [facility], [facility]    Hunger Vital Sign     Worried About Running Out of Food in the Last Year: Never true     Ran Out of Food in the Last Year: Never true   Transportation Needs: No Transportation Needs (9/13/2022)    [facility] from [facility]    PRAPARE - Transportation     Lack of Transportation (Medical): No     Lack of Transportation (Non-Medical): No   Physical Activity: Not on file   Stress: Not on file   Social Connections: Not on file   Housing Stability: Unknown (9/13/2022)    [facility] from [facility]    Housing Stability Vital Sign     Unable to Pay for Housing in the Last Year: No     Number of Places Lived in the Last Year: Not on file      Unstable Housing in the Last Year: No     Family History   Problem Relation Age of Onset    Skin cancer Father     Heart Disorder Father     Cancer Father         skin & prostate    Colon Cancer Mother     Cancer Mother         past in 1982    Diabetes Sister     Heart Disorder Sister     Skin cancer Sister     Skin cancer Brother        There were no vitals filed for this visit.    HPI:  Chief Complaint   Patient presents with    Full Skin Exam     Father hx of melanoma.  Personal hx of Aks and BCC.  LOV 2/2024.  Pt presents for FBSE.  Lesions of concern to the forehead and bilateral ears.      Follow-up history AK's, BCC clinically atypical nevi, multiple benign keratoses.  Family history of skin cancer.  Using more sunscreen, careful to wear hat less consistent with sunscreen.  Will be relocating to [place].  Not using tretinoin consistently  Past notes/ records and appropriate/relevant lab results including pathology and past body maps reviewed. Updated and new information noted in current visit.      Has used the Zyclara and Efudex.  Previously    Careful sun protection while outdoors fishing not coughing as much this year.     history BCC, clear cell tumor from left ear status post Mohs surgery.  Right central chest BCC ENC at visit 6/26/2020  Has been very careful use sunscreen.  Has not been out golfing yet very much due to the weather  Patient presents with concerns above.    Patient has been in their usual state of health.  History, medications, allergies reviewed as noted.      ROS:  Denies any other systemic complaints.  No new or changeing lesions other than noted above. No fevers, chills, night sweats, unusual sun sensitivity.  No other skin complaints.        History, medications, allergies reviewed as noted.       Physical Examination:     Well-developed well-nourished patient alert oriented in no acute distress.  Exam performed, including scalp, head, neck, face,nails, hair, external eyes,  including conjunctival mucosa, eyelids, lips external ears , arms, digits,palms.     Multiple light to medium brown, well marginated, uniformly pigmented, macules and papules 6 mm and less scattered on exam. pigmented lesions examined with dermoscopy benign-appearing patterns.     Waxy tannish keratotic papules scattered, cherry-red vascular papules scattered.    See map today's date for lesions noted .  See assessment and plan below for specific lesions.    Otherwise remarkable for lesions as noted on map.   Assessment / plan:    No orders of the defined types were placed in this encounter.      Meds & Refills for this Visit:  Requested Prescriptions      No prescriptions requested or ordered in this encounter         Encounter Diagnoses   Name Primary?    AK (actinic keratosis) Yes    Multiple nevi     Benign neoplasm of skin, unspecified location     Seborrheic keratoses     Lentigo     Encounter for follow-up surveillance of skin cancer          Right anterior neck verrucoid keratosis resolved    Erythematous scaling keratotic papules noted at sites noted on map  Actinic Keratoses.  Precancerous nature discussed. Sun protection, sunscreen/ blocks encouraged Lesions treated with cryo- .  Biopsy if not resolved.    Bilateral temples X4    Actinic keratoses.  Precancerous nature discussed.  Treatment options reviewed at length we will proceed with topical therapy with   fluorouracil   twice weekly for   6   week course  of actual medication use and may alternate weeks if necessary.  Increased redness scaling crusting irritation pain tenderness potential discussed.  Anticipate one month for resolution of symptoms.  Plan recheck in one month after completion of treatment course.  Consider alternative treatment biopsy if not resolved.      Consider long-term maintenance therapy once every other week to areas of actinic keratosis over the temples nose ears anterior scalp    Tretinoin 2-3 times weekly as  chemoprevention, may help with background of multiple seborrheic keratoses on the face as well  Tretinoin maintenance twice weekly may alternate with the fluorouracil      BCC s/p Mohs right lateral cheek.  Healing well.    History of AK's overall is doing fairly well.  Background actinic damage changes with splotchy dyschromia some erythematous patches and tan-brown patches.  Discussed sun protection.  Will use vitamin C serum twice daily, tretinoin nightly increasing nightly as tolerated, will use tretinoin discussed use on forehead temples, may use on the ears may use this to left ear scaling plaque more consistently.  This may help with the background AK's.  Will use instead of fluorouracil he has not started this.  Very concerned regarding irritation crusting redness.    Waxy tan papules at left temporal scalp seborrheic keratoses treat with cryo.  Smaller AK's over the forearms right in particular.  Overall doing well  At risk for additional AK's.  Still recommend field treatment of recurrence and image changes    For background of sun damage early AK's tretinoin 0.1% cream nightly as tolerated along with sun protection and consider repeat course of fluorouracil.  Currently no new suspicious lesions.  No atypical appearing pigmented lesions  Careful sun protection.  Patient very concerned guarding possible irritation in areas of tretinoin.  Use cautiously sparingly    Areas of sebaceous hyperplasia, waxy tan papules left lateral cheek reassurance consider trial of cryo    Other benign keratoses continue careful monitoring with history of AK's over the temples and forehead no other suspicious lesions on todays exam    BCC right central chest post electrodesiccation curettage 6/26/2020  Actinic keratoses stable extensive benign keratoses.  No recurrence of prior skin cancer      Patient with history of skin cancer.  No evidence of recurrence.      Lesions of concern tan-brown papules at posterior neck seborrheic  keratoses resolved.  Intradermal nevus posterior neck unchanged    Waxy tan plaques over lateral cheeks, temples temporal scalp reassurance regarding benign keratoses consider cryo.  Multiple benign keratoses including left ear, temples forehead.  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  Observe carefully  Overall this has remained fairly stable    Continue careful sun protection.  Multiple other benign keratoses lentigines nevi no new suspicious lesions    Sun damage, extensive keratoses over the face cheeks brow.  Discussed possible Retin-A as chemoprevention.  Reviewed over-the-counter products as well.  So this may help with some dyschromia from cryo as well.  Careful sun protection.  Retin-A application instructions reviewed.  May not be covered patient aware    Extensive lentigines keratoses no other new suspicious lesions    Please refer to map for specific lesions.  See additional diagnoses.  Pros cons of various therapies, risks benefits discussed.Pathophysiology discussed with patient.  Therapeutic options reviewed.  See  Medications in grid.  Instructions reviewed at length.    Benign nevi, seborrheic  keratoses, cherry angiomas:  Reassurance regarding other benign skin lesions.Signs and symptoms of skin cancer, ABCDE's of melanoma discussed with patient. Sunscreen use, sun protection, self exams reviewed.  Followup as noted RTC ---routine checkup    6 mos -one year or p.r.n.      No other new suspicious lesions plan recheck 3 months or as needed.  Patient aware to continue careful sun protection while he is out golfing and fishing over the summer.    The patient indicates understanding of these issues and agrees to the plan.  The patient is asked to return as noted in follow-up/  above.    This note was generated using Dragon voice recognition software.  Please contact me regarding any confusion resulting from errors in recognition.  Encounter Times   Including precharting, reviewing chart, prior notes obtaining history: 10 minutes, medical exam :10 minutes, notes on body map, plan, counseling 10minutes My total time spent caring for the patient on the day of the encounter: 30 minutes    Note to patient and family: The 21st Century Cures Act makes medical notes like these available to patients. However, be advised this is a medical document. It is intended as [id number] communication and monitoring of a patient's care needs. It is written in medical language and may contain abbreviations or verbiage that are unfamiliar. It may appear blunt or direct. Medical documents are intended to carry relevant information, facts as evident and the clinical opinion of the practitioner.

## 2024-12-04 ENCOUNTER — APPOINTMENT (OUTPATIENT)
Dept: URBAN - METROPOLITAN AREA CLINIC 211 | Age: 69
Setting detail: DERMATOLOGY
End: 2024-12-05

## 2024-12-04 DIAGNOSIS — Z12.83 ENCOUNTER FOR SCREENING FOR MALIGNANT NEOPLASM OF SKIN: ICD-10-CM

## 2024-12-04 DIAGNOSIS — Z85.828 PERSONAL HISTORY OF OTHER MALIGNANT NEOPLASM OF SKIN: ICD-10-CM

## 2024-12-04 DIAGNOSIS — L57.0 ACTINIC KERATOSIS: ICD-10-CM

## 2024-12-04 DIAGNOSIS — L81.4 OTHER MELANIN HYPERPIGMENTATION: ICD-10-CM

## 2024-12-04 DIAGNOSIS — Z80.8 FAMILY HISTORY OF MALIGNANT NEOPLASM OF OTHER ORGANS OR SYSTEMS: ICD-10-CM

## 2024-12-04 DIAGNOSIS — Z71.89 OTHER SPECIFIED COUNSELING: ICD-10-CM

## 2024-12-04 DIAGNOSIS — D22 MELANOCYTIC NEVI: ICD-10-CM

## 2024-12-04 DIAGNOSIS — D18.0 HEMANGIOMA: ICD-10-CM

## 2024-12-04 DIAGNOSIS — L82.1 OTHER SEBORRHEIC KERATOSIS: ICD-10-CM

## 2024-12-04 PROBLEM — D18.01 HEMANGIOMA OF SKIN AND SUBCUTANEOUS TISSUE: Status: ACTIVE | Noted: 2024-12-04

## 2024-12-04 PROBLEM — D22.5 MELANOCYTIC NEVI OF TRUNK: Status: ACTIVE | Noted: 2024-12-04

## 2024-12-04 PROCEDURE — OTHER SUNSCREEN RECOMMENDATIONS: OTHER

## 2024-12-04 PROCEDURE — OTHER COUNSELING: OTHER

## 2024-12-04 PROCEDURE — 99203 OFFICE O/P NEW LOW 30 MIN: CPT

## 2024-12-04 PROCEDURE — OTHER MIPS QUALITY: OTHER

## 2024-12-04 PROCEDURE — OTHER DEFER: OTHER

## 2024-12-04 PROCEDURE — OTHER OBSERVATION: OTHER

## 2024-12-04 PROCEDURE — OTHER REASSURANCE: OTHER

## 2024-12-04 ASSESSMENT — LOCATION DETAILED DESCRIPTION DERM
LOCATION DETAILED: LEFT SUPERIOR MEDIAL FOREHEAD
LOCATION DETAILED: EPIGASTRIC SKIN
LOCATION DETAILED: LEFT CENTRAL MALAR CHEEK
LOCATION DETAILED: LEFT MEDIAL INFERIOR CHEST
LOCATION DETAILED: LEFT SUPERIOR HELIX

## 2024-12-04 ASSESSMENT — LOCATION SIMPLE DESCRIPTION DERM
LOCATION SIMPLE: CHEST
LOCATION SIMPLE: LEFT FOREHEAD
LOCATION SIMPLE: ABDOMEN
LOCATION SIMPLE: LEFT EAR
LOCATION SIMPLE: LEFT CHEEK

## 2024-12-04 ASSESSMENT — LOCATION ZONE DERM
LOCATION ZONE: FACE
LOCATION ZONE: EAR
LOCATION ZONE: TRUNK

## 2025-04-07 ENCOUNTER — APPOINTMENT (OUTPATIENT)
Dept: URBAN - METROPOLITAN AREA CLINIC 211 | Age: 70
Setting detail: DERMATOLOGY
End: 2025-04-08

## 2025-04-07 DIAGNOSIS — L82.1 OTHER SEBORRHEIC KERATOSIS: ICD-10-CM

## 2025-04-07 DIAGNOSIS — L57.0 ACTINIC KERATOSIS: ICD-10-CM

## 2025-04-07 DIAGNOSIS — D485 NEOPLASM OF UNCERTAIN BEHAVIOR OF SKIN: ICD-10-CM

## 2025-04-07 DIAGNOSIS — Z85.828 PERSONAL HISTORY OF OTHER MALIGNANT NEOPLASM OF SKIN: ICD-10-CM

## 2025-04-07 PROBLEM — D48.5 NEOPLASM OF UNCERTAIN BEHAVIOR OF SKIN: Status: ACTIVE | Noted: 2025-04-07

## 2025-04-07 PROCEDURE — OTHER OBSERVATION: OTHER

## 2025-04-07 PROCEDURE — OTHER COUNSELING: OTHER

## 2025-04-07 PROCEDURE — OTHER REASSURANCE: OTHER

## 2025-04-07 PROCEDURE — OTHER DEFER: OTHER

## 2025-04-07 PROCEDURE — OTHER MIPS QUALITY: OTHER

## 2025-04-07 PROCEDURE — 11102 TANGNTL BX SKIN SINGLE LES: CPT | Mod: 59

## 2025-04-07 PROCEDURE — 11103 TANGNTL BX SKIN EA SEP/ADDL: CPT | Mod: 59

## 2025-04-07 PROCEDURE — 69100 BIOPSY OF EXTERNAL EAR: CPT | Mod: LT

## 2025-04-07 PROCEDURE — 99213 OFFICE O/P EST LOW 20 MIN: CPT | Mod: 25

## 2025-04-07 PROCEDURE — OTHER BIOPSY BY SHAVE METHOD: OTHER

## 2025-04-07 PROCEDURE — OTHER PRESCRIPTION MEDICATION MANAGEMENT: OTHER

## 2025-04-07 ASSESSMENT — LOCATION SIMPLE DESCRIPTION DERM
LOCATION SIMPLE: SUPERIOR FOREHEAD
LOCATION SIMPLE: LEFT EAR
LOCATION SIMPLE: CHEST
LOCATION SIMPLE: LEFT FOREHEAD
LOCATION SIMPLE: LEFT UPPER BACK
LOCATION SIMPLE: RIGHT LOWER BACK

## 2025-04-07 ASSESSMENT — LOCATION DETAILED DESCRIPTION DERM
LOCATION DETAILED: RIGHT SUPERIOR MEDIAL LOWER BACK
LOCATION DETAILED: STERNUM
LOCATION DETAILED: LEFT SUPERIOR HELIX
LOCATION DETAILED: LEFT SCAPHA
LOCATION DETAILED: LEFT SUPERIOR MEDIAL FOREHEAD
LOCATION DETAILED: SUPERIOR MID FOREHEAD
LOCATION DETAILED: LEFT SUPERIOR CRUS OF ANTIHELIX
LOCATION DETAILED: LEFT MID-UPPER BACK

## 2025-04-07 ASSESSMENT — LOCATION ZONE DERM
LOCATION ZONE: TRUNK
LOCATION ZONE: EAR
LOCATION ZONE: FACE

## 2025-04-07 NOTE — PROCEDURE: PRESCRIPTION MEDICATION MANAGEMENT
Plan: Patient was scheduled for a work in spot check today. Needs to discuss AK management at [facility] OV/follow up in June with Dr. Solares
Render In Strict Bullet Format?: Yes
Detail Level: Zone

## 2025-04-07 NOTE — PROCEDURE: BIOPSY BY SHAVE METHOD
Detail Level: Detailed
Depth Of Biopsy: dermis
Was A Bandage Applied: Yes
Size Of Lesion In Cm: 0.4
X Size Of Lesion In Cm: 0
Biopsy Type: H and E
Biopsy Method: Dermablade
Anesthesia Type: 1% lidocaine with epinephrine
Anesthesia Volume In Cc: 0.5
Hemostasis: Drysol
Wound Care: Petrolatum
Dressing: bandage
Destruction After The Procedure: No
Type Of Destruction Used: Curettage
Curettage Text: The wound bed was treated with curettage after the biopsy was performed.
Cryotherapy Text: The wound bed was treated with cryotherapy after the biopsy was performed.
Electrodesiccation Text: The wound bed was treated with electrodesiccation after the biopsy was performed.
Electrodesiccation And Curettage Text: The wound bed was treated with electrodesiccation and curettage after the biopsy was performed.
Silver Nitrate Text: The wound bed was treated with silver nitrate after the biopsy was performed.
Lab: [id number]
Medical Necessity Information: It is in your best interest to select a reason for this procedure from the list below. All of these items fulfill various CMS LCD requirements except the new and changing color options.
Consent: Written consent was obtained and risks were reviewed including but not limited to scarring, infection, bleeding, scabbing, incomplete removal, nerve damage and allergy to anesthesia.
Post-Care Instructions: I reviewed with the patient in detail post-care instructions. Patient is to keep the biopsy site dry overnight, and then apply bacitracin twice daily until healed. Patient may apply hydrogen peroxide soaks to remove any crusting.
Notification Instructions: Patient will be notified of biopsy results. However, patient instructed to call the office if not contacted within 2 weeks.
Billing Type: Third-Party Bill
Information: Selecting Yes will display possible errors in your note based on the variables you have selected. This validation is only offered as a suggestion for you. PLEASE NOTE THAT THE VALIDATION TEXT WILL BE REMOVED WHEN YOU FINALIZE YOUR NOTE. IF YOU WANT TO FAX A PRELIMINARY NOTE YOU WILL NEED TO TOGGLE THIS TO 'NO' IF YOU DO NOT WANT IT IN YOUR FAXED NOTE.
Size Of Lesion In Cm: 1
Size Of Lesion In Cm: 0.7

## 2025-05-07 ENCOUNTER — APPOINTMENT (OUTPATIENT)
Dept: URBAN - METROPOLITAN AREA CLINIC 211 | Age: 70
Setting detail: DERMATOLOGY
End: 2025-05-07

## 2025-05-07 DIAGNOSIS — D485 NEOPLASM OF UNCERTAIN BEHAVIOR OF SKIN: ICD-10-CM

## 2025-05-07 PROBLEM — D48.5 NEOPLASM OF UNCERTAIN BEHAVIOR OF SKIN: Status: ACTIVE | Noted: 2025-05-07

## 2025-05-07 PROBLEM — C44.519 BASAL CELL CARCINOMA OF SKIN OF OTHER PART OF TRUNK: Status: ACTIVE | Noted: 2025-05-07

## 2025-05-07 PROCEDURE — OTHER TREATMENT REGIMEN: OTHER

## 2025-05-07 PROCEDURE — 99212 OFFICE O/P EST SF 10 MIN: CPT | Mod: 25

## 2025-05-07 PROCEDURE — OTHER COUNSELING: OTHER

## 2025-05-07 PROCEDURE — 17262 DSTRJ MAL LES T/A/L 1.1-2.0: CPT | Mod: 76

## 2025-05-07 PROCEDURE — 17262 DSTRJ MAL LES T/A/L 1.1-2.0: CPT

## 2025-05-07 PROCEDURE — OTHER MIPS QUALITY: OTHER

## 2025-05-07 PROCEDURE — OTHER CURETTAGE AND DESTRUCTION: OTHER

## 2025-05-07 ASSESSMENT — LOCATION DETAILED DESCRIPTION DERM: LOCATION DETAILED: RIGHT SUPERIOR LID MARGIN

## 2025-05-07 ASSESSMENT — LOCATION SIMPLE DESCRIPTION DERM: LOCATION SIMPLE: RIGHT SUPERIOR EYELID

## 2025-05-07 ASSESSMENT — LOCATION ZONE DERM: LOCATION ZONE: EYELID

## 2025-06-04 ENCOUNTER — APPOINTMENT (OUTPATIENT)
Dept: URBAN - METROPOLITAN AREA CLINIC 211 | Age: 70
Setting detail: DERMATOLOGY
End: 2025-06-04

## 2025-06-04 DIAGNOSIS — L57.8 OTHER SKIN CHANGES DUE TO CHRONIC EXPOSURE TO NONIONIZING RADIATION: ICD-10-CM

## 2025-06-04 DIAGNOSIS — Z85.828 PERSONAL HISTORY OF OTHER MALIGNANT NEOPLASM OF SKIN: ICD-10-CM

## 2025-06-04 DIAGNOSIS — L82.1 OTHER SEBORRHEIC KERATOSIS: ICD-10-CM

## 2025-06-04 DIAGNOSIS — L81.4 OTHER MELANIN HYPERPIGMENTATION: ICD-10-CM

## 2025-06-04 DIAGNOSIS — L57.0 ACTINIC KERATOSIS: ICD-10-CM

## 2025-06-04 DIAGNOSIS — D49.2 NEOPLASM OF UNSPECIFIED BEHAVIOR OF BONE, SOFT TISSUE, AND SKIN: ICD-10-CM

## 2025-06-04 DIAGNOSIS — D18.0 HEMANGIOMA: ICD-10-CM

## 2025-06-04 DIAGNOSIS — Z71.89 OTHER SPECIFIED COUNSELING: ICD-10-CM

## 2025-06-04 DIAGNOSIS — D22 MELANOCYTIC NEVI: ICD-10-CM

## 2025-06-04 DIAGNOSIS — Z12.83 ENCOUNTER FOR SCREENING FOR MALIGNANT NEOPLASM OF SKIN: ICD-10-CM

## 2025-06-04 DIAGNOSIS — Z80.8 FAMILY HISTORY OF MALIGNANT NEOPLASM OF OTHER ORGANS OR SYSTEMS: ICD-10-CM

## 2025-06-04 PROBLEM — D22.5 MELANOCYTIC NEVI OF TRUNK: Status: ACTIVE | Noted: 2025-06-04

## 2025-06-04 PROBLEM — D22.9 MELANOCYTIC NEVI, UNSPECIFIED: Status: ACTIVE | Noted: 2025-06-04

## 2025-06-04 PROBLEM — C44.219 BASAL CELL CARCINOMA OF SKIN OF LEFT EAR AND EXTERNAL AURICULAR CANAL: Status: ACTIVE | Noted: 2025-06-04

## 2025-06-04 PROBLEM — D18.01 HEMANGIOMA OF SKIN AND SUBCUTANEOUS TISSUE: Status: ACTIVE | Noted: 2025-06-04

## 2025-06-04 PROCEDURE — OTHER OBSERVATION: OTHER

## 2025-06-04 PROCEDURE — OTHER REASSURANCE: OTHER

## 2025-06-04 PROCEDURE — OTHER COUNSELING: OTHER

## 2025-06-04 PROCEDURE — OTHER SUNSCREEN RECOMMENDATIONS: OTHER

## 2025-06-04 PROCEDURE — OTHER MIPS QUALITY: OTHER

## 2025-06-04 PROCEDURE — OTHER SKIN MEDICINALS: OTHER

## 2025-06-04 PROCEDURE — 11102 TANGNTL BX SKIN SINGLE LES: CPT

## 2025-06-04 PROCEDURE — 99214 OFFICE O/P EST MOD 30 MIN: CPT | Mod: 25

## 2025-06-04 PROCEDURE — OTHER PRESCRIPTION: OTHER

## 2025-06-04 PROCEDURE — OTHER DEFER: OTHER

## 2025-06-04 PROCEDURE — OTHER BIOPSY BY SHAVE METHOD: OTHER

## 2025-06-04 ASSESSMENT — LOCATION SIMPLE DESCRIPTION DERM
LOCATION SIMPLE: RIGHT UPPER BACK
LOCATION SIMPLE: RIGHT FOREARM
LOCATION SIMPLE: LEFT CHEEK
LOCATION SIMPLE: LEFT EAR
LOCATION SIMPLE: CHEST
LOCATION SIMPLE: SCALP
LOCATION SIMPLE: ABDOMEN
LOCATION SIMPLE: LEFT FOREHEAD
LOCATION SIMPLE: HAIR

## 2025-06-04 ASSESSMENT — LOCATION ZONE DERM
LOCATION ZONE: SCALP
LOCATION ZONE: FACE
LOCATION ZONE: EAR
LOCATION ZONE: TRUNK
LOCATION ZONE: ARM

## 2025-06-04 ASSESSMENT — LOCATION DETAILED DESCRIPTION DERM
LOCATION DETAILED: RIGHT SUPERIOR PARIETAL SCALP
LOCATION DETAILED: LEFT SUPERIOR HELIX
LOCATION DETAILED: HAIR
LOCATION DETAILED: EPIGASTRIC SKIN
LOCATION DETAILED: LEFT SUPERIOR MEDIAL FOREHEAD
LOCATION DETAILED: LEFT MEDIAL INFERIOR CHEST
LOCATION DETAILED: STERNUM
LOCATION DETAILED: RIGHT PROXIMAL DORSAL FOREARM
LOCATION DETAILED: RIGHT SUPERIOR MEDIAL UPPER BACK
LOCATION DETAILED: LEFT CENTRAL MALAR CHEEK

## 2025-06-11 ENCOUNTER — TELEPHONE (OUTPATIENT)
Facility: CLINIC | Age: 70
End: 2025-06-11

## 2025-06-11 DIAGNOSIS — D49.0 INTRADUCTAL PAPILLARY MUCINOUS NEOPLASM: ICD-10-CM

## 2025-06-11 DIAGNOSIS — K86.2 PANCREATIC CYST (HCC): Primary | ICD-10-CM

## 2025-06-11 NOTE — TELEPHONE ENCOUNTER
Dr Szymanski    The patient is due for repeat MRI/MRCP.    Last office visit: 7/8/2024    Thank you          Please see TE from 6/28/2024    Stephon Elliott MD  6/28/2024 12:06 PM CDT       See the following [employer] message sent to the patient:    \"Henry Carey,    I left a message on your voicemail.    I hope that you are feeling well.    I have reviewed the most recent MRI with our radiologist and compared this study to your previous MRIs.    We do not feel that the cysts have significantly changed in size.  The current scan is of better quality with clearer pictures.  No worrisome findings are seen.    I would recommend that we continue to follow the cysts.  I would repeat the MRI in 1 year.    If you have any questions please let me know.    I look forward to seeing you in the office on July 8.    Dr. Elliott\"    GI RNs: Please enter MRI/MRCP recall for 1 year.

## 2025-06-11 NOTE — TELEPHONE ENCOUNTER
Called and spoke to the patient, date of birth and name verified.    He confirmed he resides in [place].    I informed the patient that he is due for repeat MRI/MRCP to follow up on the pancreatic cyst.    As per the patient, he will let his PCP know.    MRI canceled in [facility].    dago Mckeon.

## 2025-07-08 ENCOUNTER — APPOINTMENT (OUTPATIENT)
Dept: URBAN - METROPOLITAN AREA CLINIC 211 | Age: 70
Setting detail: DERMATOLOGY
End: 2025-07-09

## 2025-07-08 DIAGNOSIS — R23.3 SPONTANEOUS ECCHYMOSES: ICD-10-CM

## 2025-07-08 PROBLEM — C44.612 BASAL CELL CARCINOMA OF SKIN OF RIGHT UPPER LIMB, INCLUDING SHOULDER: Status: ACTIVE | Noted: 2025-07-08

## 2025-07-08 PROBLEM — C44.219 BASAL CELL CARCINOMA OF SKIN OF LEFT EAR AND EXTERNAL AURICULAR CANAL: Status: ACTIVE | Noted: 2025-07-08

## 2025-07-08 PROCEDURE — 99212 OFFICE O/P EST SF 10 MIN: CPT | Mod: 25

## 2025-07-08 PROCEDURE — OTHER CURETTAGE AND DESTRUCTION: OTHER

## 2025-07-08 PROCEDURE — OTHER MIPS QUALITY: OTHER

## 2025-07-08 PROCEDURE — OTHER COUNSELING: OTHER

## 2025-07-08 PROCEDURE — 17262 DSTRJ MAL LES T/A/L 1.1-2.0: CPT

## 2025-07-08 PROCEDURE — OTHER REASSURANCE: OTHER

## 2025-07-08 PROCEDURE — OTHER DEFER: OTHER

## 2025-07-08 ASSESSMENT — LOCATION SIMPLE DESCRIPTION DERM: LOCATION SIMPLE: LEFT FOREARM

## 2025-07-08 ASSESSMENT — LOCATION DETAILED DESCRIPTION DERM: LOCATION DETAILED: LEFT DISTAL DORSAL FOREARM

## 2025-07-08 ASSESSMENT — LOCATION ZONE DERM: LOCATION ZONE: ARM

## (undated) DEVICE — MEDI-VAC NON-CONDUCTIVE SUCTION TUBING 6MM X 1.8M (6FT.) L: Brand: CARDINAL HEALTH

## (undated) DEVICE — Device: Brand: CUSTOM PROCEDURE KIT

## (undated) DEVICE — 6 ML SYRINGE LUER-LOCK TIP: Brand: MONOJECT

## (undated) DEVICE — Device: Brand: DEFENDO AIR/WATER/SUCTION AND BIOPSY VALVE

## (undated) DEVICE — SNARE OPTMZ PLPCTM TRP

## (undated) DEVICE — 3 ML SYRINGE LUER-LOCK TIP: Brand: MONOJECT

## (undated) DEVICE — LINE MNTR ADLT SET O2 INTMD

## (undated) DEVICE — 35 ML SYRINGE REGULAR TIP: Brand: MONOJECT

## (undated) DEVICE — SNARE CAPTIFLEX MICRO-OVL OLY

## (undated) NOTE — LETTER
8/4/2021              [address] [facility] [place] [address]         Dear Lisy Peter,      The report of the Biopsy done on 7/31/21 shows a seborrheic keratosis. This is a benign (not cancerous) growth, and requires no further treatment.

## (undated) NOTE — MR AVS SNAPSHOT
[facility] [place] [address] [address] [id number] [phone]               Thank you for choosing us for your health care visit with Christen Gao MD.  We are glad to serve you and happy to provide you with this summary of y You can access your MyChart to more actively manage your health care and view more details from this visit by going to [email or web address].   If you've recently had a stay at the Hospital you can access your discharge instructions in [address] by patricia

## (undated) NOTE — LETTER
12/04/17        Marcello Dhaliwal  [address] [facility]      Dear Quirino Dakins records indicate that you have outstanding lab work and or testing that was ordered for you and has not yet been completed:          Comp Metabolic Panel (14) [E]

## (undated) NOTE — LETTER
July 22, 2019         Federica Schilling MD  [address] [facility] [address] [id number]      Patient: Rain Ames   YOB: 1955   Date of Visit: 7/8/2019       Dear Dr. Rolo Roca,    I saw your patient, Rain Ames, on 7/1/201

## (undated) NOTE — MR AVS SNAPSHOT
[facility] [place] [address] [place]  [id number] [phone]               Thank you for choosing us for your health care visit with Angie Coley MD.  We are glad to serve you and happy to provide you with this summary of

## (undated) NOTE — MR AVS SNAPSHOT
[facility] [place] [address] [place]  [id number] [phone]               Thank you for choosing us for your health care visit with Bernabe Murphy MD.  We are glad to serve you and happy to provide you with this summary of

## (undated) NOTE — LETTER
09/24/20        Nesha Brito  [address] [facility]      Dear Maria Elena Brooks records indicate that you have outstanding lab work and or testing that was ordered for you and has not yet been completed:    MRI MRCP (W+WO)    Please call [phone]